# Patient Record
Sex: MALE | Race: WHITE | NOT HISPANIC OR LATINO | Employment: OTHER | URBAN - METROPOLITAN AREA
[De-identification: names, ages, dates, MRNs, and addresses within clinical notes are randomized per-mention and may not be internally consistent; named-entity substitution may affect disease eponyms.]

---

## 2017-06-15 ENCOUNTER — HISTORICAL (OUTPATIENT)
Dept: RADIOLOGY | Facility: HOSPITAL | Age: 57
End: 2017-06-15

## 2017-06-15 LAB
BUN SERPL-MCNC: 5 MG/DL (ref 7–18)
POC CREATININE: 0.9 MG/DL (ref 0.6–1.3)

## 2017-06-21 ENCOUNTER — HISTORICAL (OUTPATIENT)
Dept: CARDIOLOGY | Facility: HOSPITAL | Age: 57
End: 2017-06-21

## 2017-06-21 LAB
ABS NEUT (OLG): 1.72 X10(3)/MCL (ref 2.1–9.2)
BUN SERPL-MCNC: 5 MG/DL (ref 7–18)
CALCIUM SERPL-MCNC: 9.4 MG/DL (ref 8.5–10.1)
CHLORIDE SERPL-SCNC: 93 MMOL/L (ref 98–107)
CO2 SERPL-SCNC: 28 MMOL/L (ref 21–32)
CREAT SERPL-MCNC: 1.05 MG/DL (ref 0.7–1.3)
EOSINOPHIL NFR BLD MANUAL: 1 % (ref 0–8)
ERYTHROCYTE [DISTWIDTH] IN BLOOD BY AUTOMATED COUNT: 17.7 % (ref 11.5–17)
GLUCOSE SERPL-MCNC: 110 MG/DL (ref 74–106)
HCT VFR BLD AUTO: 39.3 % (ref 42–52)
HGB BLD-MCNC: 13.6 GM/DL (ref 14–18)
LYMPHOCYTES NFR BLD MANUAL: 34 % (ref 13–40)
LYMPHOCYTES NFR BLD MANUAL: 9 %
MCH RBC QN AUTO: 32.6 PG (ref 27–31)
MCHC RBC AUTO-ENTMCNC: 34.6 GM/DL (ref 33–36)
MCV RBC AUTO: 94.2 FL (ref 80–94)
MONOCYTES NFR BLD MANUAL: 8 % (ref 2–11)
NEUTROPHILS NFR BLD MANUAL: 48 % (ref 47–80)
PLATELET # BLD AUTO: 143 X10(3)/MCL (ref 130–400)
PLATELET # BLD EST: NORMAL 10*3/UL
PMV BLD AUTO: 9.1 FL (ref 7.4–10.4)
POTASSIUM SERPL-SCNC: 4.6 MMOL/L (ref 3.5–5.1)
RBC # BLD AUTO: 4.17 X10(6)/MCL (ref 4.7–6.1)
SODIUM SERPL-SCNC: 134 MMOL/L (ref 136–145)
WBC # SPEC AUTO: 3.4 X10(3)/MCL (ref 4.5–11.5)

## 2017-07-28 ENCOUNTER — HISTORICAL (OUTPATIENT)
Dept: CARDIOLOGY | Facility: HOSPITAL | Age: 57
End: 2017-07-28

## 2017-07-28 LAB
ABS NEUT (OLG): 1.65 X10(3)/MCL (ref 2.1–9.2)
BUN SERPL-MCNC: 8 MG/DL (ref 7–18)
CALCIUM SERPL-MCNC: 9.4 MG/DL (ref 8.5–10.1)
CHLORIDE SERPL-SCNC: 90 MMOL/L (ref 98–107)
CO2 SERPL-SCNC: 30 MMOL/L (ref 21–32)
CREAT SERPL-MCNC: 0.96 MG/DL (ref 0.7–1.3)
EOSINOPHIL NFR BLD MANUAL: 2 % (ref 0–8)
ERYTHROCYTE [DISTWIDTH] IN BLOOD BY AUTOMATED COUNT: 16.4 % (ref 11.5–17)
GLUCOSE SERPL-MCNC: 107 MG/DL (ref 74–106)
HCT VFR BLD AUTO: 40.6 % (ref 42–52)
HGB BLD-MCNC: 14 GM/DL (ref 14–18)
LYMPHOCYTES NFR BLD MANUAL: 24 % (ref 13–40)
LYMPHOCYTES NFR BLD MANUAL: 7 %
MCH RBC QN AUTO: 32.3 PG (ref 27–31)
MCHC RBC AUTO-ENTMCNC: 34.5 GM/DL (ref 33–36)
MCV RBC AUTO: 93.8 FL (ref 80–94)
MONOCYTES NFR BLD MANUAL: 14 % (ref 2–11)
NEUTROPHILS NFR BLD MANUAL: 52 % (ref 47–80)
PLATELET # BLD AUTO: 113 X10(3)/MCL (ref 130–400)
PLATELET # BLD EST: ABNORMAL 10*3/UL
PMV BLD AUTO: 9.2 FL (ref 7.4–10.4)
POTASSIUM SERPL-SCNC: 4.5 MMOL/L (ref 3.5–5.1)
RBC # BLD AUTO: 4.33 X10(6)/MCL (ref 4.7–6.1)
SODIUM SERPL-SCNC: 127 MMOL/L (ref 136–145)
WBC # SPEC AUTO: 2.9 X10(3)/MCL (ref 4.5–11.5)

## 2019-03-14 LAB
ABS NEUT (OLG): 2.49 X10(3)/MCL (ref 2.1–9.2)
BASOPHILS # BLD AUTO: 0 X10(3)/MCL (ref 0–0.2)
BASOPHILS NFR BLD AUTO: 1 %
BUN SERPL-MCNC: 8 MG/DL (ref 7–18)
CALCIUM SERPL-MCNC: 9.3 MG/DL (ref 8.5–10.1)
CHLORIDE SERPL-SCNC: 102 MMOL/L (ref 98–107)
CO2 SERPL-SCNC: 29 MMOL/L (ref 21–32)
CREAT SERPL-MCNC: 0.96 MG/DL (ref 0.7–1.3)
CREAT/UREA NIT SERPL: 8.3
EOSINOPHIL # BLD AUTO: 0.1 X10(3)/MCL (ref 0–0.9)
EOSINOPHIL NFR BLD AUTO: 2 %
ERYTHROCYTE [DISTWIDTH] IN BLOOD BY AUTOMATED COUNT: 15.1 % (ref 11.5–17)
GLUCOSE SERPL-MCNC: 100 MG/DL (ref 74–106)
HCT VFR BLD AUTO: 41.4 % (ref 42–52)
HGB BLD-MCNC: 13.2 GM/DL (ref 14–18)
LYMPHOCYTES # BLD AUTO: 1.8 X10(3)/MCL (ref 0.6–4.6)
LYMPHOCYTES NFR BLD AUTO: 37 %
MCH RBC QN AUTO: 32.8 PG (ref 27–31)
MCHC RBC AUTO-ENTMCNC: 31.9 GM/DL (ref 33–36)
MCV RBC AUTO: 103 FL (ref 80–94)
MONOCYTES # BLD AUTO: 0.5 X10(3)/MCL (ref 0.1–1.3)
MONOCYTES NFR BLD AUTO: 10 %
NEUTROPHILS # BLD AUTO: 2.49 X10(3)/MCL (ref 2.1–9.2)
NEUTROPHILS NFR BLD AUTO: 50 %
PLATELET # BLD AUTO: 156 X10(3)/MCL (ref 130–400)
PMV BLD AUTO: 9.5 FL (ref 9.4–12.4)
POTASSIUM SERPL-SCNC: 4.2 MMOL/L (ref 3.5–5.1)
RBC # BLD AUTO: 4.02 X10(6)/MCL (ref 4.7–6.1)
SODIUM SERPL-SCNC: 137 MMOL/L (ref 136–145)
WBC # SPEC AUTO: 5 X10(3)/MCL (ref 4.5–11.5)

## 2019-03-18 ENCOUNTER — HISTORICAL (OUTPATIENT)
Dept: CARDIOLOGY | Facility: HOSPITAL | Age: 59
End: 2019-03-18

## 2021-01-15 ENCOUNTER — HISTORICAL (OUTPATIENT)
Dept: CARDIOLOGY | Facility: HOSPITAL | Age: 61
End: 2021-01-15

## 2022-04-30 NOTE — OP NOTE
DATE OF SURGERY:    06/21/2017    SURGEON:  Andre Martínez MD    PREOPERATIVE DIAGNOSIS:  Peripheral arterial disease with bilateral lower extremity claudication.    POSTOPERATIVE DIAGNOSIS:  Peripheral arterial disease with bilateral lower extremity claudication.    PROCEDURE PERFORMED:    1. Ultrasound-guided access of right common femoral artery.  2. Aortogram with bilateral lower extremity runoff.  3. Approximately 45 minutes of monitored conscious sedation.    ANESTHESIA:  Local with 1% lidocaine.    ESTIMATED BLOOD LOSS:  Less than 10 mL.    COMPLICATIONS:  None.    INDICATIONS:  The patient is a 56-year-old male with bilateral lower extremity short distance lifestyle-limiting claudication.  Patient with dampened biphasic signals on the right and monophasic signals in the left lower extremity.  Operation indicated for further investigation.    PROCEDURE IN DETAIL:  After informed consent was obtained, the patient was taken to the operating room and placed in supine position, prepped and draped in sterile fashion.  We used ultrasound to identify the common femoral artery on the patient's right side, and these images were saved.  We accessed with an 18-gauge needle followed by a wire via Seldinger technique, and ultimately placed a #4 Lao sheath and an Omniflush catheter in the aorta to the level of the renal arteries and performed aortogram.  Patient with widely patent bilateral renal arteries.  The aorta was widely patent as well as common iliac and external iliac arteries, although there were calcifications present.  We advanced the catheter to the distal external iliac artery on the patient's left hand side and performed left lower extremity runoff.  The patient's common femoral artery and profunda femoral artery were widely patent.  There was evidence of a prior femoral endarterectomy.  His superficial femoral artery was occluded throughout its length, and there was reconstitution above his  popliteal artery at the level of the patella.  He had 3-vessel runoff to the ankle.  There were no percutaneous options for revascularization.  We performed right lower extremity runoff through the existing sheath.  The patient's common femoral artery was widely patent as was the profunda femoral artery, and similarly to the other side, there was evidence of a femoral endarterectomy.  His superficial femoral artery was patent throughout its length, although there were several areas of severe stenosis.  His popliteal artery was widely patent, and he had 3-vessel runoff to the ankle, although there was a nearly occlusive calcification at the ostium of the anterior tibial artery.  Of note, he did have a high takeoff of his posterior tibial artery.  Wires and sheaths were removed.  Pressure was held.  The patient tolerated the procedure well without any acute events or complications.  The patient was transferred in stable condition to the recovery room.        ______________________________  MD TANIA Garcias III/JANNETH  DD:  08/27/2017  Time:  04:29AM  DT:  08/28/2017  Time:  08:37AM  Job #:  [id number]

## 2022-04-30 NOTE — OP NOTE
DATE OF SURGERY:    07/28/2017    SURGEON:  Andre Martínez MD    PREOPERATIVE DIAGNOSIS:  Peripheral arterial disease with bilateral lower extremity claudication.    POSTOPERATIVE DIAGNOSIS:  Peripheral arterial disease with bilateral lower extremity claudication.    PROCEDURE:  Ultrasound guided access left common femoral artery.   Aortogram with right lower extremity runoff.   Laser atherectomy of right superficial femoral artery.   Balloon angioplasty of right superficial femoral artery (6 mm chocolate).    ANESTHESIA:  Local with 1% lidocaine.    ESTIMATED BLOOD LOSS:  Less than 10 cc.    COMPLICATIONS:  None.    INDICATIONS FOR PROCEDURE:  This is a 56 year old male with a known history of bilateral lower extremity claudication.  He has left superficial femoral artery occlusion and right superficial femoral artery stenosis.  Operation indicated for revascularization of right lower extremity prior to left lower extremity bypass.    PROCEDURE IN DETAIL:  After informed consent was obtained, the patient was taken to the OR, and placed in a supine position, prepped and draped in the usual sterile fashion.  We used ultrasound to identify the common femoral artery and we accessed it with an 18 gauge needle followed by a wire via Seldinger technique and placed a 4-Australian sheath and Omniflush catheter in the aorta.  We performed aortogram which did not visualize the renal vessels well.  His right renal artery appeared to not have any significant stenoses.  His aorta had circumferential calcifications along with his common and internal and external iliac arteries however all were widely patent.  We advanced the catheter to the distal iliac artery on the patient's right hand side and performed right lower extremity runoff.  Patient's prior common femoral artery endarterectomy, which I believe was done by Dr. Horan, was widely patent.  His profunda femoral artery was widely patent as well.  The superficial femoral  artery had a stenosis proximally and was widely patent distal to this at the mid distal aspect.  There was severe stenoses throughout.  His popliteal artery had scattered disease but was patent and he had three vessel runoff to the ankle with a near occlusive calcification at the ostium of the anterior tibial artery.  We exchanged for a 6-Australian up and over sheath.  We crossed these superficial femoral artery lesions without difficulty and proved true lumen with contrast injection.  We performed laser atherectomy on multiple intensities throughout the right lower extremity and followed it with balloon angioplasty with a 6 mm cutting balloon.  Repeating imaging showed brisk flow of contrast through his superficial femoral artery with good resolution of these lesions.  Wires and sheaths were removed.  Pressure was held.  Patient tolerated the procedure well without any complications.  Patient was transferred to the Recovery Room.        ______________________________  MD TANIA Garcias III/LALITO  DD:  08/29/2017  Time:  03:51PM  DT:  08/30/2017  Time:  12:25PM  Job #:  [id number]

## 2022-04-30 NOTE — OP NOTE
DATE OF SURGERY:    01/15/2021    SURGEON:  Andre Martínez MD    PREOPERATIVE DIAGNOSIS:  Peripheral arterial disease with bilateral lower extremity claudication.    POSTOPERATIVE DIAGNOSIS:  Peripheral arterial disease with bilateral lower extremity claudication.    PROCEDURES PERFORMED:    1. Ultrasound-guided access of left common femoral artery.  2. Ultrasound-guided access of left brachial artery.  3. Aortogram with bilateral lower extremity runoff.  4. Catheter placement in third-order vessel.  5. Approximately 1 hour of moderate monitored conscious sedation.    ANESTHESIA:  Local with 1% lidocaine.    ESTIMATED BLOOD LOSS:  10 cc.    COMPLICATIONS:  None.    INDICATIONS FOR PROCEDURE:  The patient is a 60-year-old male with a known history of severe peripheral arterial disease.  He has a known occluded bypass of his left lower extremity and severe disease throughout with bilateral lower extremity claudication.  Operation indicated for further investigation.    PROCEDURE IN DETAIL:  After informed consent was obtained, the patient was taken to the operating room and placed in supine position.  He was prepped and draped in sterile fashion.  We used ultrasound to identify the common femoral artery on the patient's left hand side, and these images were saved.  We accessed this with a micropuncture needle followed by a wire via Seldinger technique.  However, there was difficulty with the wire traversing the iliac system.  We placed the micropuncture sheath into the common femoral artery and performed imaging which revealed an occluded left iliac system.  At this point, we performed runoff down the left lower extremity which revealed a patent common femoral artery and profunda femoral artery.  The patient's superficial femoral artery was occluded throughout its length as was the popliteal artery and known bypass occlusion.  The patient's anterior tibial and peroneal arteries were also occluded, and the  patient had single-vessel posterior tibial runoff to the level of the ankle.  At this point, we used ultrasound to identify the brachial artery in the patient's left upper extremity, and these images were saved.  We accessed this with an 18-gauge needle followed by a wire via Seldinger technique, and ultimately we placed a 4-Chinese sheath. We placed an Omni Flush catheter in the aorta and performed an aortogram which revealed a widely patent aorta and right iliac system.  As previously identified, his left iliac system was occluded.  We advanced the catheter to the femoral vessel of the right lower extremity and performed right lower extremity runoff.  The patient's common femoral artery and profunda femoral artery were patent.  The superficial femoral artery was occluded throughout its length, and he had reconstitution of the popliteal artery at the knee joint.  The patient has likely 2-vessel runoff to the level of the ankle via the peroneal and posterior tibial artery with a large calcification at the tibial trifurcation.  At this point, all wires and sheaths were removed, and pressure was held.  The patient tolerated the procedure well without any acute events or complications.  The patient had no options for percutaneous intervention and, if he were to have lifestyle-limiting symptoms, he would require an inflow procedure for his left lower extremity and a femoral to below-knee popliteal bypass of his right lower extremity with endarterectomy of the tibial trifurcation.        ______________________________  MD TANIA Garcias III/SK  DD:  02/23/2021  Time:  07:37AM  DT:  02/23/2021  Time:  08:09AM  Job #:  [id number]

## 2022-04-30 NOTE — OP NOTE
DATE OF SURGERY:    03/18/2019    SURGEON:  Andre Martínez MD    PREOPERATIVE DIAGNOSIS:  Peripheral arterial disease with left lower extremity claudication.    POSTOPERATIVE DIAGNOSIS:  Peripheral arterial disease with left lower extremity claudication.    PROCEDURE PERFORMED:    1. Ultrasound-guided access to right common femoral artery.  2. Aortogram with left lower extremity runoff.   3. Catheter placement in 3rd order of vessel.    4. Approximately 30 minutes of monitored moderate conscious sedation.    ANESTHESIA:  Local with 1% lidocaine.    ESTIMATED BLOOD LOSS:  Less than 5 cc.    COMPLICATIONS:  None.    INDICATIONS FOR PROCEDURE:  The patient is a 58-year-old male with known history of severe peripheral disease.  He has had a left lower extremity bypass in the past.  This is known to be occluded chronically, and he was seen in the office with worsening claudication.    PROCEDURE IN DETAIL:  After informed consent was obtained, the patient was taken to the operating room, placed in supine position.  He was prepped and draped in sterile fashion.  We used ultrasound to identify the common femoral artery on the patient's right hand side and these images were saved.  We accessed with an 18-gauge needle, followed by a wire via Seldinger technique.  We ultimately placed a 4-Korean sheath.  We placed an Omni Flush catheter in the aorta and performed aortogram which revealed a widely patent aorta with mild disease.  He also had a widely patent bilateral iliac system with no significant flow-limiting stenosis.  We advanced the catheter to the distal external iliac artery on the patient's left hand side and performed left lower extremity runoff.  The patient's common femoral artery and profunda femoral artery were widely patent with mild disease.  Patient's superficial femoral was occluded throughout its length, as was his popliteal artery.  He had collateralization through his lower extremity that fed 2  vessel runoff to the ankle via his anterior tibial and posterior tibial artery, which appeared relatively disease-free throughout its length.  There are no options for percutaneous intervention, and we will further discuss bypass, if appropriate, as an outpatient.  The wires and sheaths were removed and pressure was held.  The     patient tolerated the procedure well without any acute events or complications, was transferred in stable condition to the recovery room.        ______________________________  MD TANIA Garcias III/ALEX  DD:  03/18/2019  Time:  09:44AM  DT:  03/18/2019  Time:  10:14AM  Job #:  [id number]

## 2022-09-26 ENCOUNTER — OFFICE VISIT (OUTPATIENT)
Dept: NEUROLOGY | Facility: CLINIC | Age: 62
End: 2022-09-26
Payer: MEDICARE

## 2022-09-26 VITALS
HEIGHT: 66 IN | SYSTOLIC BLOOD PRESSURE: 124 MMHG | WEIGHT: 180 LBS | BODY MASS INDEX: 28.93 KG/M2 | DIASTOLIC BLOOD PRESSURE: 84 MMHG

## 2022-09-26 DIAGNOSIS — G40.009 LOCALIZATION-RELATED (FOCAL) (PARTIAL) IDIOPATHIC EPILEPSY AND EPILEPTIC SYNDROMES WITH SEIZURES OF LOCALIZED ONSET, NOT INTRACTABLE, WITHOUT STATUS EPILEPTICUS: Primary | ICD-10-CM

## 2022-09-26 DIAGNOSIS — I63.9 OCCIPITAL CEREBRAL INFARCTION: ICD-10-CM

## 2022-09-26 DIAGNOSIS — I67.82 CHRONIC CEREBRAL ISCHEMIA: ICD-10-CM

## 2022-09-26 DIAGNOSIS — H53.462 HEMIANOPIA, HOMONYMOUS, LEFT: ICD-10-CM

## 2022-09-26 PROCEDURE — 3074F PR MOST RECENT SYSTOLIC BLOOD PRESSURE < 130 MM HG: ICD-10-PCS | Mod: CPTII,S$GLB,, | Performed by: SPECIALIST

## 2022-09-26 PROCEDURE — 99999 PR PBB SHADOW E&M-EST. PATIENT-LVL IV: ICD-10-PCS | Mod: PBBFAC,,, | Performed by: SPECIALIST

## 2022-09-26 PROCEDURE — 1159F MED LIST DOCD IN RCRD: CPT | Mod: CPTII,S$GLB,, | Performed by: SPECIALIST

## 2022-09-26 PROCEDURE — 3008F PR BODY MASS INDEX (BMI) DOCUMENTED: ICD-10-PCS | Mod: CPTII,S$GLB,, | Performed by: SPECIALIST

## 2022-09-26 PROCEDURE — 3079F DIAST BP 80-89 MM HG: CPT | Mod: CPTII,S$GLB,, | Performed by: SPECIALIST

## 2022-09-26 PROCEDURE — 99205 PR OFFICE/OUTPT VISIT, NEW, LEVL V, 60-74 MIN: ICD-10-PCS | Mod: S$GLB,,, | Performed by: SPECIALIST

## 2022-09-26 PROCEDURE — 3008F BODY MASS INDEX DOCD: CPT | Mod: CPTII,S$GLB,, | Performed by: SPECIALIST

## 2022-09-26 PROCEDURE — 1159F PR MEDICATION LIST DOCUMENTED IN MEDICAL RECORD: ICD-10-PCS | Mod: CPTII,S$GLB,, | Performed by: SPECIALIST

## 2022-09-26 PROCEDURE — 3079F PR MOST RECENT DIASTOLIC BLOOD PRESSURE 80-89 MM HG: ICD-10-PCS | Mod: CPTII,S$GLB,, | Performed by: SPECIALIST

## 2022-09-26 PROCEDURE — 99205 OFFICE O/P NEW HI 60 MIN: CPT | Mod: S$GLB,,, | Performed by: SPECIALIST

## 2022-09-26 PROCEDURE — 99999 PR PBB SHADOW E&M-EST. PATIENT-LVL IV: CPT | Mod: PBBFAC,,, | Performed by: SPECIALIST

## 2022-09-26 PROCEDURE — 3074F SYST BP LT 130 MM HG: CPT | Mod: CPTII,S$GLB,, | Performed by: SPECIALIST

## 2022-09-26 RX ORDER — AZELASTINE 1 MG/ML
1 SPRAY, METERED NASAL
COMMUNITY

## 2022-09-26 RX ORDER — METOPROLOL TARTRATE 50 MG/1
50 TABLET ORAL 2 TIMES DAILY
COMMUNITY
Start: 2022-09-12

## 2022-09-26 RX ORDER — LEVETIRACETAM 500 MG/1
500 TABLET ORAL 2 TIMES DAILY
Qty: 60 TABLET | Refills: 11 | Status: SHIPPED | OUTPATIENT
Start: 2022-09-26 | End: 2023-10-31

## 2022-09-26 RX ORDER — FAMOTIDINE 40 MG/1
40 TABLET, FILM COATED ORAL NIGHTLY
COMMUNITY
Start: 2022-09-12

## 2022-09-26 RX ORDER — IPRATROPIUM BROMIDE 42 UG/1
2 SPRAY, METERED NASAL
COMMUNITY
Start: 2022-09-12

## 2022-09-26 RX ORDER — FLUTICASONE PROPIONATE 50 MCG
1 SPRAY, SUSPENSION (ML) NASAL
COMMUNITY

## 2022-09-26 RX ORDER — FERROUS SULFATE TAB 325 MG (65 MG ELEMENTAL FE) 325 (65 FE) MG
TAB ORAL NIGHTLY
COMMUNITY
Start: 2022-08-22

## 2022-09-26 RX ORDER — TAMSULOSIN HYDROCHLORIDE 0.4 MG/1
1 CAPSULE ORAL NIGHTLY
COMMUNITY
Start: 2022-08-09

## 2022-09-26 RX ORDER — APIXABAN 5 MG/1
5 TABLET, FILM COATED ORAL 2 TIMES DAILY
COMMUNITY
Start: 2022-09-12

## 2022-09-26 RX ORDER — TADALAFIL 5 MG/1
5 TABLET ORAL
COMMUNITY
Start: 2022-09-12

## 2022-09-26 RX ORDER — ROSUVASTATIN CALCIUM 20 MG/1
20 TABLET, COATED ORAL NIGHTLY
COMMUNITY
Start: 2022-09-12

## 2022-09-26 RX ORDER — TRAZODONE HYDROCHLORIDE 50 MG/1
50 TABLET ORAL NIGHTLY
COMMUNITY
Start: 2022-09-14

## 2022-09-26 RX ORDER — CLOPIDOGREL BISULFATE 75 MG/1
75 TABLET ORAL DAILY
COMMUNITY
Start: 2022-09-12

## 2022-09-26 RX ORDER — PANTOPRAZOLE SODIUM 40 MG/1
40 TABLET, DELAYED RELEASE ORAL EVERY MORNING
COMMUNITY
Start: 2022-09-12

## 2022-09-26 NOTE — PROGRESS NOTES
Subjective:       Patient ID: Ean Funes is a 61 y.o. male.    Chief Complaint: ***    HPI:            Pt ref by Dr Bashir Phipps for LLE cramping (Here for syncope episodes, dizziness//Pt family reports episodes of shaking limbs, staring blankly, unable to speak; lasting 15 seconds or so; unaware of episodes; no confusion after episodes;  few episodes total. Denies tongue biting, loss of bowel/bladder control. Seen @ [facility] 2022 for dizziness; CTA Head/Neck, CT chest done during visits, reports and CD to review today. )      notes may also be on facesheet for HPI, ROS, and other sections     Review of Systems              Social History     Socioeconomic History    Marital status:    Tobacco Use    Smoking status: Every Day     Types: Cigarettes    Smokeless tobacco: Never   Substance and Sexual Activity    Alcohol use: Yes    Drug use: Never     ----------------------------  Hypertension      Current Outpatient Medications:     azelastine (ASTELIN) 137 mcg (0.1 %) nasal spray, 1 spray by Nasal route., Disp: , Rfl:     clopidogreL (PLAVIX) 75 mg tablet, Take 75 mg by mouth once daily., Disp: , Rfl:     ELIQUIS 5 mg Tab, Take 5 mg by mouth 2 (two) times daily., Disp: , Rfl:     famotidine (PEPCID) 40 MG tablet, SMARTSI Tablet(s) By Mouth Every Evening, Disp: , Rfl:     FEROSUL 325 mg (65 mg iron) Tab tablet, Take by mouth every morning., Disp: , Rfl:     fluticasone propionate (FLONASE) 50 mcg/actuation nasal spray, 1 spray by Nasal route., Disp: , Rfl:     ipratropium (ATROVENT) 42 mcg (0.06 %) nasal spray, 2 sprays 2 (two) times daily., Disp: , Rfl:     metoprolol tartrate (LOPRESSOR) 50 MG tablet, Take 50 mg by mouth 2 (two) times daily., Disp: , Rfl:     pantoprazole (PROTONIX) 40 MG tablet, Take 40 mg by mouth every morning., Disp: , Rfl:     rosuvastatin (CRESTOR) 20 MG tablet, Take 20 mg by mouth every evening., Disp: , Rfl:     tadalafiL (CIALIS) 5 MG tablet, Take 5 mg  "by mouth once daily., Disp: , Rfl:     tamsulosin (FLOMAX) 0.4 mg Cap, Take 1 capsule by mouth every evening., Disp: , Rfl:     traZODone (DESYREL) 50 MG tablet, Take 50 mg by mouth every evening., Disp: , Rfl:     levETIRAcetam (KEPPRA) 500 MG Tab, Take 1 tablet (500 mg total) by mouth 2 (two) times daily., Disp: 60 tablet, Rfl: 11     Objective:        Exam:   /84 (BP Location: Left arm, Patient Position: Sitting)   Ht 5' 6" (1.676 m)   Wt 81.6 kg (180 lb)   BMI 29.05 kg/m²     General Exam  __unaccompanied  if accompanied, by__  body habitus_ Body mass index is 29.05 kg/m².    mental status_alert and appropriate  oropharynx_Mallampati grade_  neck_  heart__  extremities_  skin_    Neurological:  cortical function__  MMSE:   No flowsheet data found.    speech__  cranial nerves:  CN 2 VF_ok   fundi_   CN 3, 4, 6 EOMs_ok  CN 3, pupils_ok    CN 7_no lower face asymmetry  CN 8_hearing _  CN 12 tongue_ok    motor__  tone:   muscle stretch reflexes__  Vib sens_  Pin sens_  plantars__  tremor: _  coordination: _  gait_   Romberg:     Neuroimaging:  Images and imaging reports reviewed.  Study / studies:   Rads summary:  , radiologist  My comments:       Labs:      __ new patient here and/or   ___ multiple issues/ diagnoses or problems [if not enumerated in note then discussed in encounter but chose to or failed to document]    complexity of data   __high _mod   __ images and reports reviewed:  __ hx obtained from family or accompaniment:   __other studies reviewed   __studies ordered __   __studies considered or discussed but not ordered __  __DDx discussed __    risks  __high _mod     __ (possible or definite) neurodegenerative condition expected to progress  __ (poss or def)             autoimmune condition with possibility of flares or unexpected attack  __ (poss or def)             seiz d.o. with possib of recurr seiz's   __ cerebrovasc ds with risk of recurrence of stroke  __ CNS meds (and/or) potentially " high risk non CNS meds which may cause medical or behavioral side effects  __ fall risk  __ driving discussed   __ diagnosis unclear or DDx wide making risk uncertain to high  __other:      MDM/Medical Decision Making used for CPT choice based on above elements:  __high _moderate         Assessment/Plan:       Problem List Items Addressed This Visit          Neuro    Localization-related (focal) (partial) idiopathic epilepsy and epileptic syndromes with seizures of localized onset, not intractable, without status epilepticus - Primary    Chronic cerebral ischemia    Occipital cerebral infarction       Ophtho    Hemianopia, homonymous, left             Other comments/ follow up:          No orders of the defined types were placed in this encounter.     Medications Ordered This Encounter   Medications    levETIRAcetam (KEPPRA) 500 MG Tab     Sig: Take 1 tablet (500 mg total) by mouth 2 (two) times daily.     Dispense:  60 tablet     Refill:  11        __med prescribed  __med modified   __med refilled (if taking over Rx from prior provider)    Aim follow up ___ months ___Dr. MILES         __JT, NP       __KR, NP   __Followup PRN    Bashir Worrell MD [facility]

## 2022-09-26 NOTE — PROGRESS NOTES
"Subjective:       Patient ID: Ean Funes is a 61 y.o. male.    Chief Complaint: new visit for syncope / seizure / old stroke     HPI:            Pt ref by Dr Bashir Phipps for LLE cramping (Here for syncope episodes, dizziness//Pt family reports episodes of shaking limbs, staring blankly, unable to speak; lasting 15 seconds or so; unaware of episodes; no confusion after episodes;  few episodes total. Denies tongue biting, loss of bowel/bladder control. Seen @ [facility] 2022 for dizziness; CTA Head/Neck, CT chest done during visits, reports and CD to review today. )  Partner describes at least one episode where he was in recliner watching movie with her when his arms and legs stiffened up and he was unable to speak with wide eyes stare and this WAS NOT preceded by coughing or choking     notes may also be on facesheet for HPI, ROS, and other sections     Review of Systems  "TIA" years ago  with some persistent L vision impairment [showed him and her the chronic R occip infarct that caused this]   He has quit smoking in the past but is smoking now   He's tried cpap in the past but could not tolerate it   He drinks beers, sometimes greater than 6/d [yesterday nine per partner]          Social History     Socioeconomic History    Marital status:    Tobacco Use    Smoking status: Every Day     Types: Cigarettes    Smokeless tobacco: Never   Substance and Sexual Activity    Alcohol use: Yes    Drug use: Never     ----------------------------  Hypertension      Current Outpatient Medications:     azelastine (ASTELIN) 137 mcg (0.1 %) nasal spray, 1 spray by Nasal route., Disp: , Rfl:     clopidogreL (PLAVIX) 75 mg tablet, Take 75 mg by mouth once daily., Disp: , Rfl:     ELIQUIS 5 mg Tab, Take 5 mg by mouth 2 (two) times daily., Disp: , Rfl:     famotidine (PEPCID) 40 MG tablet, SMARTSI Tablet(s) By Mouth Every Evening, Disp: , Rfl:     FEROSUL 325 mg (65 mg iron) Tab tablet, Take by " "mouth every morning., Disp: , Rfl:     fluticasone propionate (FLONASE) 50 mcg/actuation nasal spray, 1 spray by Nasal route., Disp: , Rfl:     ipratropium (ATROVENT) 42 mcg (0.06 %) nasal spray, 2 sprays 2 (two) times daily., Disp: , Rfl:     metoprolol tartrate (LOPRESSOR) 50 MG tablet, Take 50 mg by mouth 2 (two) times daily., Disp: , Rfl:     pantoprazole (PROTONIX) 40 MG tablet, Take 40 mg by mouth every morning., Disp: , Rfl:     rosuvastatin (CRESTOR) 20 MG tablet, Take 20 mg by mouth every evening., Disp: , Rfl:     tadalafiL (CIALIS) 5 MG tablet, Take 5 mg by mouth once daily., Disp: , Rfl:     tamsulosin (FLOMAX) 0.4 mg Cap, Take 1 capsule by mouth every evening., Disp: , Rfl:     traZODone (DESYREL) 50 MG tablet, Take 50 mg by mouth every evening., Disp: , Rfl:     levETIRAcetam (KEPPRA) 500 MG Tab, Take 1 tablet (500 mg total) by mouth 2 (two) times daily., Disp: 60 tablet, Rfl: 11     Objective:        Exam:   /84 (BP Location: Left arm, Patient Position: Sitting)   Ht 5' 6" (1.676 m)   Wt 81.6 kg (180 lb)   BMI 29.05 kg/m²     General Exam  if accompanied, by__ girlfriend/ signifficant other   body habitus_ Body mass index is 29.05 kg/m².    mental status_alert and appropriate  oropharynx_Mallampati grade_  neck_  Heart__ irreg   Extremities_ R LE taut   [facility]   skin_    Neurological:  cortical function__  MMSE:   No flowsheet data found.    Speech __ ok   cranial nerves:  CN 2 VF_L hemianopia   fundi_   CN 3, 4, 6 EOMs_ok  CN 3, pupils_ok    CN 7_no lower face asymmetry  CN 8_hearing _  CN 12 tongue_ok    Motor__ ok   tone:   muscle stretch reflexes__  Vib sens_ ok   Pin sens_  plantars__  tremor: _  coordination: _  gait_ unaided   Romberg:     Neuroimaging:  Images and imaging reports reviewed.  Recent CT CTAs   My comments:   Chronic R occipital infarct and patchy widespread supratentorial chr isch change   Occl R ICA     Labs:      _._ new patient " here and/or   _.__ multiple issues/ diagnoses or problems [if not enumerated in note then discussed in encounter but chose to or failed to document]    complexity of data   _._high _mod   _._ images and reports reviewed:  _._ hx obtained from family or accompaniment:   __other studies reviewed   __studies ordered __   __studies considered or discussed but not ordered __  __DDx discussed __    risks  _._high _mod     __ (possible or definite) neurodegenerative condition expected to progress  __ (poss or def)             autoimmune condition with possibility of flares or unexpected attack  __ (poss or def)             seiz d.o. with possib of recurr seiz's   __ cerebrovasc ds with risk of recurrence of stroke  __ CNS meds (and/or) potentially high risk non CNS meds which may cause medical or behavioral side effects  __ fall risk  _._ driving discussed   advised HE SHOULD NOT BE DRIVING due to vision loss and seizures   __ diagnosis unclear or DDx wide making risk uncertain to high  __other:      MDM/Medical Decision Making used for CPT choice based on above elements:  _._high _moderate         Assessment/Plan:       Problem List Items Addressed This Visit          Neuro    Localization-related (focal) (partial) idiopathic epilepsy and epileptic syndromes with seizures of localized onset, not intractable, without status epilepticus - Primary    Chronic cerebral ischemia    Occipital cerebral infarction       Ophtho    Hemianopia, homonymous, left   NO DRIVING   Limit alcoholic beverages to two servings per day or less   Counseled about smoking cessation       Other comments/ follow up:           Medications Ordered This Encounter   Medications    levETIRAcetam (KEPPRA) 500 MG Tab     Sig: Take 1 tablet (500 mg total) by mouth 2 (two) times daily.     Dispense:  60 tablet     Refill:  11          Aim follow up _6__ months ___Dr. GINGER Worrell MD [facility]

## 2022-10-20 DIAGNOSIS — K13.70 MOUTH LESION: Primary | ICD-10-CM

## 2023-02-15 ENCOUNTER — HOSPITAL ENCOUNTER (OUTPATIENT)
Dept: RADIOLOGY | Facility: CLINIC | Age: 63
Discharge: HOME | End: 2023-02-15
Attending: SPECIALIST
Payer: MEDICARE

## 2023-02-15 ENCOUNTER — OFFICE VISIT (OUTPATIENT)
Dept: ORTHOPEDICS | Facility: CLINIC | Age: 63
End: 2023-02-15
Payer: MEDICARE

## 2023-02-15 VITALS
HEART RATE: 62 BPM | HEIGHT: 66 IN | DIASTOLIC BLOOD PRESSURE: 44 MMHG | WEIGHT: 182 LBS | SYSTOLIC BLOOD PRESSURE: 85 MMHG | BODY MASS INDEX: 29.25 KG/M2

## 2023-02-15 DIAGNOSIS — M25.561 RIGHT KNEE PAIN, UNSPECIFIED CHRONICITY: ICD-10-CM

## 2023-02-15 DIAGNOSIS — M25.562 CHRONIC PAIN OF BOTH KNEES: ICD-10-CM

## 2023-02-15 DIAGNOSIS — M17.11 PRIMARY OSTEOARTHRITIS OF RIGHT KNEE: Primary | ICD-10-CM

## 2023-02-15 DIAGNOSIS — M25.552 BILATERAL HIP PAIN: ICD-10-CM

## 2023-02-15 DIAGNOSIS — M25.561 CHRONIC PAIN OF BOTH KNEES: ICD-10-CM

## 2023-02-15 DIAGNOSIS — M25.551 BILATERAL HIP PAIN: ICD-10-CM

## 2023-02-15 DIAGNOSIS — I73.9 PERIPHERAL ARTERIAL DISEASE: ICD-10-CM

## 2023-02-15 DIAGNOSIS — M17.0 PRIMARY OSTEOARTHRITIS OF BOTH KNEES: Primary | ICD-10-CM

## 2023-02-15 DIAGNOSIS — G89.29 CHRONIC PAIN OF BOTH KNEES: ICD-10-CM

## 2023-02-15 PROCEDURE — 99203 OFFICE O/P NEW LOW 30 MIN: CPT | Mod: ,,, | Performed by: SPECIALIST

## 2023-02-15 PROCEDURE — 73564 XR KNEE COMP 4 OR MORE VIEWS BILAT: ICD-10-PCS | Mod: 50,,, | Performed by: SPECIALIST

## 2023-02-15 PROCEDURE — 73521 X-RAY EXAM HIPS BI 2 VIEWS: CPT | Mod: ,,, | Performed by: SPECIALIST

## 2023-02-15 PROCEDURE — 1160F PR REVIEW ALL MEDS BY PRESCRIBER/CLIN PHARMACIST DOCUMENTED: ICD-10-PCS | Mod: CPTII,,, | Performed by: SPECIALIST

## 2023-02-15 PROCEDURE — 3008F BODY MASS INDEX DOCD: CPT | Mod: CPTII,,, | Performed by: SPECIALIST

## 2023-02-15 PROCEDURE — 73521 XR HIPS BILATERAL 2 VIEW INCL AP PELVIS: ICD-10-PCS | Mod: ,,, | Performed by: SPECIALIST

## 2023-02-15 PROCEDURE — 3078F DIAST BP <80 MM HG: CPT | Mod: CPTII,,, | Performed by: SPECIALIST

## 2023-02-15 PROCEDURE — 99203 PR OFFICE/OUTPT VISIT, NEW, LEVL III, 30-44 MIN: ICD-10-PCS | Mod: ,,, | Performed by: SPECIALIST

## 2023-02-15 PROCEDURE — 3078F PR MOST RECENT DIASTOLIC BLOOD PRESSURE < 80 MM HG: ICD-10-PCS | Mod: CPTII,,, | Performed by: SPECIALIST

## 2023-02-15 PROCEDURE — 1159F MED LIST DOCD IN RCRD: CPT | Mod: CPTII,,, | Performed by: SPECIALIST

## 2023-02-15 PROCEDURE — 1159F PR MEDICATION LIST DOCUMENTED IN MEDICAL RECORD: ICD-10-PCS | Mod: CPTII,,, | Performed by: SPECIALIST

## 2023-02-15 PROCEDURE — 3074F SYST BP LT 130 MM HG: CPT | Mod: CPTII,,, | Performed by: SPECIALIST

## 2023-02-15 PROCEDURE — 73564 X-RAY EXAM KNEE 4 OR MORE: CPT | Mod: 50,,, | Performed by: SPECIALIST

## 2023-02-15 PROCEDURE — 1160F RVW MEDS BY RX/DR IN RCRD: CPT | Mod: CPTII,,, | Performed by: SPECIALIST

## 2023-02-15 PROCEDURE — 3008F PR BODY MASS INDEX (BMI) DOCUMENTED: ICD-10-PCS | Mod: CPTII,,, | Performed by: SPECIALIST

## 2023-02-15 PROCEDURE — 3074F PR MOST RECENT SYSTOLIC BLOOD PRESSURE < 130 MM HG: ICD-10-PCS | Mod: CPTII,,, | Performed by: SPECIALIST

## 2023-02-15 NOTE — PROGRESS NOTES
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    doxycycline (VIBRAMYCIN) 50 mg/5 mL Syrp Take 50 mg by mouth.    ELIQUIS 5 mg Tab Take 5 mg by mouth 2 (two) times daily.    famotidine (PEPCID) 40 MG tablet SMARTSI Tablet(s) By Mouth Every Evening    levETIRAcetam (KEPPRA) 500 MG Tab Take 1 tablet (500 mg total) by mouth 2 (two) times daily.    metoprolol tartrate (LOPRESSOR) 50 MG tablet Take 50 mg by mouth 2 (two) times daily.    pantoprazole (PROTONIX) 40 MG tablet Take 40 mg by mouth every morning.    rosuvastatin (CRESTOR) 20 MG tablet Take 20 mg by mouth every evening.    azelastine (ASTELIN) 137 mcg (0.1 %) nasal spray 1 spray by Nasal route.    FEROSUL 325 mg (65 mg iron) Tab tablet Take by mouth every morning.    fluticasone propionate (FLONASE) 50 mcg/actuation nasal spray 1 spray by Nasal route.    ipratropium (ATROVENT) 42 mcg (0.06 %) nasal spray 2 sprays 2 (two) times daily.    tadalafiL (CIALIS) 5 MG tablet Take 5 mg by mouth once daily.    tamsulosin (FLOMAX) 0.4 mg Cap Take 1 capsule by mouth every evening.    traZODone (DESYREL) 50 MG tablet Take 50 mg by mouth every evening.     No current facility-administered medications for this visit.       Review of patient's allergies indicates:   Allergen Reactions    Strawberry Anaphylaxis, Itching and Other (See Comments)     Other reaction(s): Hives, Throat closes       Family History   Family history unknown: Yes       Social History     Socioeconomic History    Marital status:    Tobacco Use    Smoking status: Every Day     Types: Cigarettes    Smokeless tobacco: Never   Substance and Sexual Activity    Alcohol use: Yes    Drug use: Never       Chief Complaint:   Chief Complaint   Patient  "presents with    Left Knee - Pain     Right knee pain. Been hurting all his life. Has had scopes done in the past. Right hip has been hurting him recently. Constant pain that hurts him on the outside and behind the knee.        Consulting Physician: No ref. provider found    History of present illness:    This is a 62 y.o. year old male who complains of bilateral knee pain and bilateral buttock pain.  Patient has advanced osteoarthritis of both knees.  He also has peripheral arterial disease.  He has had multiple stents and iliac grafts as well as femoral popliteal bypass grafts.  This has been performed in both lower extremities.  He has good perfusion.  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  He ambulates with a cane and walker.    Review of Systems:    Constitution:   Denies chills, fever, and sweats.  HENT:   Denies headaches or blurry vision.  Cardiovascular:  Denies chest pain or irregular heart beat.  Respiratory:   Denies cough or shortness of breath.  Gastrointestinal:  Denies abdominal pain, nausea, or vomiting.  Musculoskeletal:   Denies muscle cramps.  Neurological:   Denies dizziness or focal weakness.  Psychiatric/Behavior: Normal mental status.  Hematology/Lymph:  Denies bleeding problem or easy bruising/bleeding.  Skin:    Denies rash or suspicious lesions.    Examination:    Vital Signs:    Vitals:    02/15/23 1340   BP: (!) 85/44   Pulse: 62   Weight: 82.6 kg (182 lb)   Height: 5' 6" (1.676 m)   PainSc: 10-Worst pain ever       Body mass index is 29.38 kg/m².    Constitution:   Well-developed, well nourished patient in no acute distress.  Neurological:   Alert and oriented x 3 and cooperative to examination.     Psychiatric/Behavior: Normal mental status.  Respiratory:   No shortness of breath.  Eyes:    Extraoccular muscles intact  Skin:    No scars, rash or suspicious lesions.    Physical Exam:     General Musculoskeletal Exam   Gait: " antalgic       Right Knee Exam     Inspection   Erythema: absent  Effusion: present  Deformity: present  Bruising: absent    Tenderness   The patient is tender to palpation of the condyle, iliotibial band, lateral joint line and lateral retinaculum.    Crepitus   The patient has crepitus of the lateral joint line.    Range of Motion   Extension: abnormal   Flexion: abnormal   10 degrees to 120 degrees    Tests   Meniscus   Porter:  Medial - negative Lateral - positive  Ligament Examination   MCL - Valgus: normal (0 to 2mm)  LCL - Varus: normal  Patella   Passive Patellar Tilt: neutral    Other   Sensation: normal    Comments:  Valgus deformity    Muscle Strength   Right Lower Extremity   Quadriceps:  5/5   Hamstrin/5     Vascular Exam     Right Pulses  Dorsalis Pedis:      2+  Posterior Tibial:      2+          General Musculoskeletal Exam   Gait: antalgic         Left Knee Exam     Inspection   Erythema: absent  Effusion: present  Deformity: present  Bruising: absent    Tenderness   The patient tender to palpation of the lateral retinaculum, lateral joint line, condyle and iliotibial band.    Crepitus   The patient has crepitus of the lateral joint line.    Range of Motion   Extension: abnormal   Flexion: abnormal   10 degrees to 120 degrees    Tests   Meniscus   Porter:  Medial - negative Lateral - negative  Stability   MCL - Valgus: normal (0 to 2mm)  LCL - Varus: normal (0 to 2mm)  Patella   Passive Patellar Tilt: neutral    Other   Sensation: normal    Comments:  Valgus deformity    Muscle Strength   Left Lower Extremity   Quadriceps:  5/5   Hamstrin/5     Vascular Exam       Left Pulses  Dorsalis Pedis:      2+  Posterior Tibial:      2+    Bilateral hip exam:  Full active and passive range of motion with no tenderness   No weight-bearing tenderness   Mild tenderness in right and left buttocks   No swelling, no redness, no increased heat  Intact motor and sensory function      Imaging: X-rays  ordered and images interpreted today personally by me of four views of the right and left knees which show that the patient is bone-on-bone in the lateral compartments of both knees with grade 4 changes and sclerosis in the lateral compartments.  He has tricompartmental osteophytes.  Impression: Advanced osteoarthrosis right and left knees.  Peripheral arterial disease with calcifications noted as well as bypass grafts and stents noted.       X-rays ordered and images interpreted today personally by me of AP pelvis and AP and lateral of right and left hips which show normal cartilage spaces and no evidence of osteoarthrosis of either hip.  Impression: As above.  Assessment: Primary osteoarthritis of both knees    Right knee pain, unspecified chronicity    Chronic pain of both knees  -     Cancel: X-Ray Knee 3 View Bilateral; Future; Expected date: 02/15/2023  -     X-Ray Knee Complete 4 Or More Views Bilat; Future; Expected date: 02/15/2023    Bilateral hip pain  -     X-Ray Hips Bilateral 2 View Incl AP Pelvis; Future; Expected date: 02/15/2023    Peripheral arterial disease        Plan:  Discussed surgical and nonsurgical options.  Patient has had multiple injections in the past and does not want to go this route again.  He has had physical therapy as well.  Has persistent pain and wants to proceed with operation.    Plan is robotic assisted right total knee arthroplasty without a tourniquet secondary to his peripheral arterial disease and bypass grafts    Risks, benefits, alternatives, and complications were explained to the patient and/or patient representative. They understand, agree, and want to proceed with the operation/ procedure.        DISCLAIMER: This note may have been dictated using voice recognition software and may contain grammatical errors.     NOTE: Consult report sent to referring provider via [employer].

## 2023-03-16 DIAGNOSIS — R26.89 IMPAIRED GAIT AND MOBILITY: ICD-10-CM

## 2023-03-16 DIAGNOSIS — M17.11 PRIMARY OSTEOARTHRITIS OF RIGHT KNEE: Primary | ICD-10-CM

## 2023-03-16 DIAGNOSIS — I73.9 PERIPHERAL ARTERIAL DISEASE: ICD-10-CM

## 2023-03-16 DIAGNOSIS — Z01.812 PRE-OPERATIVE LABORATORY EXAMINATION: ICD-10-CM

## 2023-03-16 DIAGNOSIS — I10 HYPERTENSION, UNSPECIFIED TYPE: ICD-10-CM

## 2023-03-16 DIAGNOSIS — Z95.828 HISTORY OF EXTREMITY BYPASS GRAFT: ICD-10-CM

## 2023-03-16 RX ORDER — ACETAMINOPHEN 500 MG
1000 TABLET ORAL
Status: CANCELLED | OUTPATIENT
Start: 2023-03-16

## 2023-03-16 RX ORDER — SCOLOPAMINE TRANSDERMAL SYSTEM 1 MG/1
1 PATCH, EXTENDED RELEASE TRANSDERMAL ONCE AS NEEDED
Status: CANCELLED | OUTPATIENT
Start: 2023-03-16 | End: 2034-08-11

## 2023-03-16 RX ORDER — SODIUM CHLORIDE, SODIUM GLUCONATE, SODIUM ACETATE, POTASSIUM CHLORIDE AND MAGNESIUM CHLORIDE 30; 37; 368; 526; 502 MG/100ML; MG/100ML; MG/100ML; MG/100ML; MG/100ML
250 INJECTION, SOLUTION INTRAVENOUS CONTINUOUS
Status: CANCELLED | OUTPATIENT
Start: 2023-03-16

## 2023-03-16 RX ORDER — KETOROLAC TROMETHAMINE 10 MG/1
10 TABLET, FILM COATED ORAL
Status: CANCELLED | OUTPATIENT
Start: 2023-03-16 | End: 2023-03-16

## 2023-03-16 RX ORDER — TRANEXAMIC ACID 650 MG/1
1950 TABLET ORAL
Status: CANCELLED | OUTPATIENT
Start: 2023-03-16 | End: 2023-03-16

## 2023-03-16 RX ORDER — GABAPENTIN 100 MG/1
300 CAPSULE ORAL
Status: CANCELLED | OUTPATIENT
Start: 2023-03-16

## 2023-03-17 ENCOUNTER — PATIENT MESSAGE (OUTPATIENT)
Dept: ORTHOPEDICS | Facility: CLINIC | Age: 63
End: 2023-03-17

## 2023-03-17 ENCOUNTER — TELEPHONE (OUTPATIENT)
Dept: ORTHOPEDICS | Facility: CLINIC | Age: 63
End: 2023-03-17

## 2023-03-17 NOTE — TELEPHONE ENCOUNTER
Pt's significant other stated that Pt is not feeling well and is unable to make pre-op appt on 3/17/23 at 9:30 am.     LVM for S/O that we R/S appt on Wednesday 3/22/23 at 8:15 a.m. per Kaden.    Sent [facility] message as well.

## 2023-03-21 ENCOUNTER — TELEPHONE (OUTPATIENT)
Dept: ORTHOPEDICS | Facility: CLINIC | Age: 63
End: 2023-03-21
Payer: MEDICARE

## 2023-03-21 NOTE — TELEPHONE ENCOUNTER
Pt's wife wanted to know if Pt needed to fast for his pre-op labs tomorrow, 3/22/23. I informed her that it is ideal if Pt can fast for labs.    She also inquired if we received Pt's ECHO from last week from Dr. Phipps in [place]. I do not show receipt of this so she gave me Dr. Phipps's office number.    I LVM for nurse to fax those results to our fax number.    Pt verbalized understanding and will call with any questions or concerns.

## 2023-03-22 ENCOUNTER — HOSPITAL ENCOUNTER (OUTPATIENT)
Dept: RADIOLOGY | Facility: HOSPITAL | Age: 63
Discharge: HOME | End: 2023-03-22
Attending: PHYSICIAN ASSISTANT
Payer: MEDICARE

## 2023-03-22 ENCOUNTER — OFFICE VISIT (OUTPATIENT)
Dept: ORTHOPEDICS | Facility: CLINIC | Age: 63
End: 2023-03-22
Payer: MEDICARE

## 2023-03-22 VITALS
BODY MASS INDEX: 30.05 KG/M2 | SYSTOLIC BLOOD PRESSURE: 111 MMHG | WEIGHT: 187 LBS | HEIGHT: 66 IN | DIASTOLIC BLOOD PRESSURE: 63 MMHG | TEMPERATURE: 97 F | HEART RATE: 58 BPM

## 2023-03-22 DIAGNOSIS — I10 HYPERTENSION, UNSPECIFIED TYPE: ICD-10-CM

## 2023-03-22 DIAGNOSIS — R26.89 IMPAIRED GAIT AND MOBILITY: ICD-10-CM

## 2023-03-22 DIAGNOSIS — I73.9 PERIPHERAL ARTERIAL DISEASE: ICD-10-CM

## 2023-03-22 DIAGNOSIS — R79.9 ABNORMAL BLOOD CHEMISTRY: ICD-10-CM

## 2023-03-22 DIAGNOSIS — Z95.828 HISTORY OF EXTREMITY BYPASS GRAFT: ICD-10-CM

## 2023-03-22 DIAGNOSIS — M17.11 PRIMARY OSTEOARTHRITIS OF RIGHT KNEE: Primary | ICD-10-CM

## 2023-03-22 DIAGNOSIS — Z01.812 PRE-OPERATIVE LABORATORY EXAMINATION: ICD-10-CM

## 2023-03-22 DIAGNOSIS — M17.11 PRIMARY OSTEOARTHRITIS OF RIGHT KNEE: ICD-10-CM

## 2023-03-22 LAB — MRSA PCR SCRN (OHS): NOT DETECTED

## 2023-03-22 PROCEDURE — 1159F MED LIST DOCD IN RCRD: CPT | Mod: CPTII,,, | Performed by: PHYSICIAN ASSISTANT

## 2023-03-22 PROCEDURE — 71046 X-RAY EXAM CHEST 2 VIEWS: CPT | Mod: TC

## 2023-03-22 PROCEDURE — 1159F PR MEDICATION LIST DOCUMENTED IN MEDICAL RECORD: ICD-10-PCS | Mod: CPTII,,, | Performed by: PHYSICIAN ASSISTANT

## 2023-03-22 PROCEDURE — 1160F PR REVIEW ALL MEDS BY PRESCRIBER/CLIN PHARMACIST DOCUMENTED: ICD-10-PCS | Mod: CPTII,,, | Performed by: PHYSICIAN ASSISTANT

## 2023-03-22 PROCEDURE — 3008F PR BODY MASS INDEX (BMI) DOCUMENTED: ICD-10-PCS | Mod: CPTII,,, | Performed by: PHYSICIAN ASSISTANT

## 2023-03-22 PROCEDURE — 99213 PR OFFICE/OUTPT VISIT, EST, LEVL III, 20-29 MIN: ICD-10-PCS | Mod: ,,, | Performed by: PHYSICIAN ASSISTANT

## 2023-03-22 PROCEDURE — 3074F SYST BP LT 130 MM HG: CPT | Mod: CPTII,,, | Performed by: PHYSICIAN ASSISTANT

## 2023-03-22 PROCEDURE — 3078F DIAST BP <80 MM HG: CPT | Mod: CPTII,,, | Performed by: PHYSICIAN ASSISTANT

## 2023-03-22 PROCEDURE — 99213 OFFICE O/P EST LOW 20 MIN: CPT | Mod: ,,, | Performed by: PHYSICIAN ASSISTANT

## 2023-03-22 PROCEDURE — 3008F BODY MASS INDEX DOCD: CPT | Mod: CPTII,,, | Performed by: PHYSICIAN ASSISTANT

## 2023-03-22 PROCEDURE — 3078F PR MOST RECENT DIASTOLIC BLOOD PRESSURE < 80 MM HG: ICD-10-PCS | Mod: CPTII,,, | Performed by: PHYSICIAN ASSISTANT

## 2023-03-22 PROCEDURE — 3074F PR MOST RECENT SYSTOLIC BLOOD PRESSURE < 130 MM HG: ICD-10-PCS | Mod: CPTII,,, | Performed by: PHYSICIAN ASSISTANT

## 2023-03-22 PROCEDURE — 1160F RVW MEDS BY RX/DR IN RCRD: CPT | Mod: CPTII,,, | Performed by: PHYSICIAN ASSISTANT

## 2023-03-22 NOTE — H&P
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    clopidogreL (PLAVIX) 75 mg tablet Take 75 mg by mouth once daily.    doxycycline (VIBRAMYCIN) 50 mg/5 mL Syrp Take 50 mg by mouth.    ELIQUIS 5 mg Tab Take 5 mg by mouth 2 (two) times daily.    famotidine (PEPCID) 40 MG tablet SMARTSI Tablet(s) By Mouth Every Evening    FEROSUL 325 mg (65 mg iron) Tab tablet Take by mouth every morning.    fluticasone propionate (FLONASE) 50 mcg/actuation nasal spray 1 spray by Nasal route.    ipratropium (ATROVENT) 42 mcg (0.06 %) nasal spray 2 sprays 2 (two) times daily.    levETIRAcetam (KEPPRA) 500 MG Tab Take 1 tablet (500 mg total) by mouth 2 (two) times daily.    metoprolol tartrate (LOPRESSOR) 50 MG tablet Take 50 mg by mouth 2 (two) times daily.    pantoprazole (PROTONIX) 40 MG tablet Take 40 mg by mouth every morning.    rosuvastatin (CRESTOR) 20 MG tablet Take 20 mg by mouth every evening.    tadalafiL (CIALIS) 5 MG tablet Take 5 mg by mouth once daily.    tamsulosin (FLOMAX) 0.4 mg Cap Take 1 capsule by mouth every evening.    traZODone (DESYREL) 50 MG tablet Take 50 mg by mouth every evening.     No current facility-administered medications for this visit.       Review of patient's allergies indicates:   Allergen Reactions    Strawberry Anaphylaxis, Itching and Other (See Comments)     Other reaction(s): Hives, Throat closes       Family History   Family history unknown: Yes       Social History     Socioeconomic History    Marital status:    Tobacco Use    Smoking status: Every Day     Types: Cigarettes    Smokeless tobacco: Never   Substance and Sexual Activity    Alcohol use: Yes    Drug use: Never       Chief Complaint:   Chief Complaint   Patient  "presents with    Right Knee - Pre-op Exam       Consulting Physician: No ref. provider found    History of present illness:    This is a 62 y.o. year old male who presents today for robotic assisted right total knee arthroplasty on April 11th.  No new complaints or concerns today.  Patient has advanced osteoarthritis of both knees.  He also has peripheral arterial disease.  He has had multiple stents and iliac grafts as well as femoral popliteal bypass grafts.  This has been performed in both lower extremities.  He has good perfusion.  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  He is currently on Eliquis and Plavix.  He ambulates with a cane and walker.    Review of Systems:    Constitution:   Denies chills, fever, and sweats.  HENT:   Denies headaches or blurry vision.  Cardiovascular:  Denies chest pain or irregular heart beat.  Respiratory:   Denies cough or shortness of breath.  Gastrointestinal:  Denies abdominal pain, nausea, or vomiting.  Musculoskeletal:   Denies muscle cramps.  Neurological:   Denies dizziness or focal weakness.  Psychiatric/Behavior: Normal mental status.  Hematology/Lymph:  Denies bleeding problem or easy bruising/bleeding.  Skin:    Denies rash or suspicious lesions.    Examination:    Vital Signs:    Vitals:    03/22/23 0836   BP: 111/63   Pulse: (!) 58   Temp: 97.2 °F (36.2 °C)   Weight: 84.8 kg (187 lb)   Height: 5' 5.65" (1.668 m)       Body mass index is 30.5 kg/m².    Constitution:   Well-developed, well nourished patient in no acute distress.  Neurological:   Alert and oriented x 3 and cooperative to examination.     Psychiatric/Behavior: Normal mental status.  Respiratory:   No shortness of breath.  Eyes:    Extraoccular muscles intact  Skin:    No scars, rash or suspicious lesions.    Physical Exam:     General Musculoskeletal Exam   Gait: antalgic       Right Knee Exam     Inspection   Erythema: absent  Effusion: " present  Deformity: present  Bruising: absent    Tenderness   The patient is tender to palpation of the condyle, iliotibial band, lateral joint line and lateral retinaculum.    Crepitus   The patient has crepitus of the lateral joint line.    Range of Motion   Extension: abnormal   Flexion: abnormal   10 degrees to 120 degrees    Tests   Meniscus   Porter:  Medial - negative Lateral - positive  Ligament Examination   MCL - Valgus: normal (0 to 2mm)  LCL - Varus: normal  Patella   Passive Patellar Tilt: neutral    Other   Sensation: normal    Comments:  Valgus deformity    Muscle Strength   Right Lower Extremity   Quadriceps:  5/5   Hamstrin/5     Vascular Exam     Right Pulses  Dorsalis Pedis:      2+  Posterior Tibial:      2+          General Musculoskeletal Exam   Gait: antalgic               Imaging: X-rays reviewed of the right and left knees which show that the patient is bone-on-bone in the lateral compartments of both knees with grade 4 changes and sclerosis in the lateral compartments.  He has tricompartmental osteophytes.  Impression: Advanced osteoarthrosis right and left knees.  Peripheral arterial disease with calcifications noted as well as bypass grafts and stents noted.       X-rays ordered and images interpreted today personally by me of AP pelvis and AP and lateral of right and left hips which show normal cartilage spaces and no evidence of osteoarthrosis of either hip.  Impression: As above.  Assessment: Primary osteoarthritis of right knee    Peripheral arterial disease    History of extremity bypass graft    Hypertension, unspecified type    Impaired gait and mobility    Pre-operative laboratory examination    Abnormal blood chemistry        Plan:  Robotic assisted right total knee arthroplasty without a tourniquet secondary to his peripheral arterial disease and bypass grafts  He is currently on Eliquis and Plavix and we will have this held prior to surgery and obtain cardiac clearance  and resume postoperatively    The patient has a history of cardiac disease and is at high risk for adverse effects of surgery and anesthesia. We will admit as inpatient and expect the patient to stay 2 nights in the hospital. We will closely monitor fluid intake and output, vital signs, neurological assessments and will use telemetry if needed. We plan for discharge once patient is stable and at baseline       Risks, benefits, alternatives, and complications were explained to the patient and/or patient representative. They understand, agree, and want to proceed with the operation/ procedure.        DISCLAIMER: This note may have been dictated using voice recognition software and may contain grammatical errors.     NOTE: Consult report sent to referring provider via [employer].

## 2023-03-23 ENCOUNTER — TELEPHONE (OUTPATIENT)
Dept: ORTHOPEDICS | Facility: CLINIC | Age: 63
End: 2023-03-23
Payer: MEDICARE

## 2023-03-23 NOTE — TELEPHONE ENCOUNTER
Spoke with Natacha at Dr. Rodríguez Pino's office about Patient's HgB results from pre-op.    Requested that they see patient to evaluate why Pt's HgB has continued to decrease over the past 7 months and to call Pt to set up appt.    Also informed her that we can send a referral to hematology per Dr. Barron if needed.    Faxing lab results to [phone]    Also faxing results to Lis at [facility]/Dr. Phipps since Pt has been seen for cardiac clearance. Fax is [phone]

## 2023-03-30 ENCOUNTER — OFFICE VISIT (OUTPATIENT)
Dept: NEUROLOGY | Facility: CLINIC | Age: 63
End: 2023-03-30
Payer: MEDICARE

## 2023-03-30 VITALS
DIASTOLIC BLOOD PRESSURE: 72 MMHG | WEIGHT: 187 LBS | HEIGHT: 65 IN | SYSTOLIC BLOOD PRESSURE: 116 MMHG | BODY MASS INDEX: 31.16 KG/M2

## 2023-03-30 DIAGNOSIS — I67.82 CHRONIC CEREBRAL ISCHEMIA: ICD-10-CM

## 2023-03-30 DIAGNOSIS — G40.009 LOCALIZATION-RELATED (FOCAL) (PARTIAL) IDIOPATHIC EPILEPSY AND EPILEPTIC SYNDROMES WITH SEIZURES OF LOCALIZED ONSET, NOT INTRACTABLE, WITHOUT STATUS EPILEPTICUS: Primary | ICD-10-CM

## 2023-03-30 DIAGNOSIS — I63.9 OCCIPITAL CEREBRAL INFARCTION: ICD-10-CM

## 2023-03-30 DIAGNOSIS — H53.462 HEMIANOPIA, HOMONYMOUS, LEFT: ICD-10-CM

## 2023-03-30 PROCEDURE — 99214 PR OFFICE/OUTPT VISIT, EST, LEVL IV, 30-39 MIN: ICD-10-PCS | Mod: S$GLB,,, | Performed by: SPECIALIST

## 2023-03-30 PROCEDURE — 3078F PR MOST RECENT DIASTOLIC BLOOD PRESSURE < 80 MM HG: ICD-10-PCS | Mod: CPTII,S$GLB,, | Performed by: SPECIALIST

## 2023-03-30 PROCEDURE — 99999 PR PBB SHADOW E&M-EST. PATIENT-LVL III: ICD-10-PCS | Mod: PBBFAC,,, | Performed by: SPECIALIST

## 2023-03-30 PROCEDURE — 1160F RVW MEDS BY RX/DR IN RCRD: CPT | Mod: CPTII,S$GLB,, | Performed by: SPECIALIST

## 2023-03-30 PROCEDURE — 99214 OFFICE O/P EST MOD 30 MIN: CPT | Mod: S$GLB,,, | Performed by: SPECIALIST

## 2023-03-30 PROCEDURE — 3008F PR BODY MASS INDEX (BMI) DOCUMENTED: ICD-10-PCS | Mod: CPTII,S$GLB,, | Performed by: SPECIALIST

## 2023-03-30 PROCEDURE — 3074F SYST BP LT 130 MM HG: CPT | Mod: CPTII,S$GLB,, | Performed by: SPECIALIST

## 2023-03-30 PROCEDURE — 1159F PR MEDICATION LIST DOCUMENTED IN MEDICAL RECORD: ICD-10-PCS | Mod: CPTII,S$GLB,, | Performed by: SPECIALIST

## 2023-03-30 PROCEDURE — 99999 PR PBB SHADOW E&M-EST. PATIENT-LVL III: CPT | Mod: PBBFAC,,, | Performed by: SPECIALIST

## 2023-03-30 PROCEDURE — 1159F MED LIST DOCD IN RCRD: CPT | Mod: CPTII,S$GLB,, | Performed by: SPECIALIST

## 2023-03-30 PROCEDURE — 3008F BODY MASS INDEX DOCD: CPT | Mod: CPTII,S$GLB,, | Performed by: SPECIALIST

## 2023-03-30 PROCEDURE — 1160F PR REVIEW ALL MEDS BY PRESCRIBER/CLIN PHARMACIST DOCUMENTED: ICD-10-PCS | Mod: CPTII,S$GLB,, | Performed by: SPECIALIST

## 2023-03-30 PROCEDURE — 3074F PR MOST RECENT SYSTOLIC BLOOD PRESSURE < 130 MM HG: ICD-10-PCS | Mod: CPTII,S$GLB,, | Performed by: SPECIALIST

## 2023-03-30 PROCEDURE — 3078F DIAST BP <80 MM HG: CPT | Mod: CPTII,S$GLB,, | Performed by: SPECIALIST

## 2023-03-30 RX ORDER — AZELASTINE 1 MG/ML
SPRAY, METERED NASAL
COMMUNITY

## 2023-03-30 NOTE — LETTER
March 30, 2023        Rodríguez Pino MD  [address]  [place] [address]             [facility] [address], [address]  [facility] [address]  Phone: [phone]   Patient: Ean Funes   MR Number: [id number]   YOB: 1960   Date of Visit: 3/30/2023     Dear Dr. Pino,     Attached records on mutual patient.     Sincerely,      Bashir Worrell MD            CC  No Recipients    Enclosure

## 2023-03-30 NOTE — PROGRESS NOTES
Subjective:      Subjective:         Patient ID: Ean Funes is a 62 y.o. male.    Chief Complaint: epilepsy/seizure follow up     HPI:           F/U Sz.-Stroke.  (Pts GF (Maris Ball) id here today. Pt states he has not had any stroke like symptoms or Sz like activity sine last OV. PT states he has not had a Sz since being on Keppra. Pt taking Keppra 500mb 1/2 tab BID and is tolerating it well. Pt drives. Pts GF states they decreased his Keppra to a 1/2 tab bid due to it was making the pt angry and irritated. )      notes may also be on facesheet for HPI, ROS, and other sections     Review of Systems  Sleep:  Depression:           Social History     Socioeconomic History    Marital status:    Tobacco Use    Smoking status: Every Day     Types: Cigarettes    Smokeless tobacco: Never   Substance and Sexual Activity    Alcohol use: Yes    Drug use: Never         Current Outpatient Medications:     azelastine (ASTELIN) 137 mcg (0.1 %) nasal spray, Astelin 137 mcg (0.1 %) nasal spray aerosol  Spray 2 sprays twice a day by intranasal route as directed for 30 days., Disp: , Rfl:     clopidogreL (PLAVIX) 75 mg tablet, Take 75 mg by mouth once daily., Disp: , Rfl:     ELIQUIS 5 mg Tab, Take 5 mg by mouth 2 (two) times daily., Disp: , Rfl:     famotidine (PEPCID) 40 MG tablet, SMARTSI Tablet(s) By Mouth Every Evening, Disp: , Rfl:     FEROSUL 325 mg (65 mg iron) Tab tablet, Take by mouth every morning., Disp: , Rfl:     fluticasone propionate (FLONASE) 50 mcg/actuation nasal spray, 1 spray by Nasal route., Disp: , Rfl:     levETIRAcetam (KEPPRA) 500 MG Tab, Take 1 tablet (500 mg total) by mouth 2 (two) times daily. (Patient taking differently: Take 500 mg by mouth 2 (two) times daily. 1/2 tab BID), Disp: 60 tablet, Rfl: 11    metoprolol tartrate (LOPRESSOR) 50 MG tablet, Take 50 mg by mouth 2 (two) times daily., Disp: , Rfl:     pantoprazole (PROTONIX) 40 MG tablet, Take 40 mg by mouth every morning.,  "Disp: , Rfl:     rosuvastatin (CRESTOR) 20 MG tablet, Take 20 mg by mouth every evening., Disp: , Rfl:     tadalafiL (CIALIS) 5 MG tablet, Take 5 mg by mouth once daily., Disp: , Rfl:     tamsulosin (FLOMAX) 0.4 mg Cap, Take 1 capsule by mouth every evening., Disp: , Rfl:     traZODone (DESYREL) 50 MG tablet, Take 50 mg by mouth every evening., Disp: , Rfl:     azelastine (ASTELIN) 137 mcg (0.1 %) nasal spray, 1 spray by Nasal route., Disp: , Rfl:     doxycycline (VIBRAMYCIN) 50 mg/5 mL Syrp, Take 50 mg by mouth., Disp: , Rfl:     ipratropium (ATROVENT) 42 mcg (0.06 %) nasal spray, 2 sprays 2 (two) times daily., Disp: , Rfl:      Objective:      Exam  /72   Ht 5' 5" (1.651 m)   Wt 84.8 kg (187 lb)   BMI 31.12 kg/m²     General:   If accompanied, by:_   Heart__ RRR    Neurological    Speech: ok   vis fields: L hemianopia   EOMs: ok   Motor: no gross deficits   coord:   Gait: unaided     Neuroimaging:  My comments:     Prior EEG:     Labs:    meds:      __ multiple issues/ diagnoses or problems [if not enumerated in note then discussed in encounter but chose to or failed to document]    complexity of data   __high ._mod   __ images and reports reviewed:  __ hx obtained from family or accompaniment:   __other studies reviewed   __studies ordered __   __studies considered or discussed but not ordered __  __DDx discussed __    risks  _.._high _mod   _._ seizure disorder with risks of recurrent seizures and risk of injury or death ( possible or definite)      _._ potentially high risk meds (and/or) CNS medications which may cause medical or behavioral side effects  _._ fall risk  _.._ driving discussed HE is reminded he should not be driving under any circumstances     __ diagnosis unclear or DDx wide making risk uncertain to high  __other:      MDM/Medical Decision Making used for CPT choice based on above elements:  __high ._moderate         Assessment/Plan:       Problem List Items Addressed This Visit       "    Neuro    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

## 2023-04-11 ENCOUNTER — TELEPHONE (OUTPATIENT)
Dept: ORTHOPEDICS | Facility: CLINIC | Age: 63
End: 2023-04-11

## 2023-04-11 ENCOUNTER — TELEPHONE (OUTPATIENT)
Dept: PAIN MEDICINE | Facility: CLINIC | Age: 63
End: 2023-04-11
Payer: MEDICARE

## 2023-04-11 NOTE — TELEPHONE ENCOUNTER
01:27pm - patient's wife Maris called asking if we received labs and to see if he will have a new sx date?     02:00pm- I called the patient back to let her know that we did receive the lab work and the results. I let her know that Brittany will get with Kaden and Dr. Barron (telephone encounter opened) and will call her back in a day or so with an update. She verbalized a clear understanding.

## 2023-04-11 NOTE — TELEPHONE ENCOUNTER
Zoey from Dr. Pino's wanted to confirm that we received Pt's last OV notes and surgical clearance for Pt.    Pt saw Dr. Pino on 3/28/23 from our referral due to low HgB. They transfused Pt on 4/5/23 with Pt's levels now being stable per Dr. Pino.    Zoey stated that they are still unsure why hemoglobin was so low so they are referring Pt back to Dr. Estrada, gastroenterologist, for an upper endoscopy and colonoscopy to r/o bleeding.    Pt had a diagnostic colonoscopy performed over summer of 2022 which was negative, and Dr. Pino also performed a fecal occult blood test which was also negative.    I informed Zoey that I would give notes and updates to both Dr. Barron and Kaden. If Dr. Barron felt the Pt can move forward with TKA, then we will reach out and have him scheduled. I also informed her that Dr. Barron might want the Pt to have all of this settled before operating as well.    Zoey verbalized understanding and will call with any questions or concerns.    Keeping encounter open at this time to document further if Dr. Barron decides he wants to operate on Pt in the next few months.

## 2023-04-24 ENCOUNTER — TELEPHONE (OUTPATIENT)
Dept: ORTHOPEDICS | Facility: CLINIC | Age: 63
End: 2023-04-24
Payer: MEDICARE

## 2023-04-24 DIAGNOSIS — M17.11 PRIMARY OSTEOARTHRITIS OF RIGHT KNEE: Primary | ICD-10-CM

## 2023-04-24 NOTE — TELEPHONE ENCOUNTER
Patient called regarding the lab results and blood count. He stated he wanted to know if we have an update with an appointment with Dr. Kwadwo Marie?    I called the patient back and spoke with his wife Maris. She stated that she would like to speak with Kaden because she would like answers and would like to receive an update.     I let her know that I will let Kaden know. She verbalized a clear understanding.

## 2023-04-25 NOTE — TELEPHONE ENCOUNTER
I spoke with Brittany and she stated that she would send a referral to Dr. Anne Marie regarding the issues fof the low HgB.     I sent the referral off and informed the patient that this is the latest update. I also let them know that Kaden will reach out to the patient on 04/26/23 to answer any questions that they may have for him.     I left all of this in a VM.

## 2023-05-03 ENCOUNTER — OFFICE VISIT (OUTPATIENT)
Dept: HEMATOLOGY/ONCOLOGY | Facility: CLINIC | Age: 63
End: 2023-05-03
Payer: MEDICARE

## 2023-05-03 VITALS
BODY MASS INDEX: 28.08 KG/M2 | OXYGEN SATURATION: 100 % | HEIGHT: 67 IN | SYSTOLIC BLOOD PRESSURE: 136 MMHG | WEIGHT: 178.88 LBS | DIASTOLIC BLOOD PRESSURE: 77 MMHG | HEART RATE: 57 BPM | RESPIRATION RATE: 14 BRPM | TEMPERATURE: 98 F

## 2023-05-03 DIAGNOSIS — D64.9 ANEMIA, UNSPECIFIED TYPE: Primary | ICD-10-CM

## 2023-05-03 DIAGNOSIS — D50.0 IRON DEFICIENCY ANEMIA DUE TO CHRONIC BLOOD LOSS: ICD-10-CM

## 2023-05-03 DIAGNOSIS — D51.3 OTHER DIETARY VITAMIN B12 DEFICIENCY ANEMIA: ICD-10-CM

## 2023-05-03 DIAGNOSIS — D69.6 THROMBOCYTOPENIA: ICD-10-CM

## 2023-05-03 DIAGNOSIS — M17.11 PRIMARY OSTEOARTHRITIS OF RIGHT KNEE: ICD-10-CM

## 2023-05-03 PROBLEM — D50.9 IRON DEFICIENCY ANEMIA: Status: ACTIVE | Noted: 2023-05-03

## 2023-05-03 LAB
ADDITIONAL FINDINGS (OHS): ABNORMAL
ANISOCYTOSIS BLD QL SMEAR: ABNORMAL
BASOPHILS # BLD AUTO: 0.02 X10(3)/MCL
BASOPHILS NFR BLD AUTO: 0.4 %
DIRECT COOMBS (DAT): NORMAL
EOSINOPHIL # BLD AUTO: 0.05 X10(3)/MCL (ref 0–0.9)
EOSINOPHIL NFR BLD AUTO: 0.9 %
ERYTHROCYTE [DISTWIDTH] IN BLOOD BY AUTOMATED COUNT: ABNORMAL %
FERRITIN SERPL-MCNC: 81.51 NG/ML (ref 21.81–274.66)
FOLATE SERPL-MCNC: 9.5 NG/ML (ref 7–31.4)
HAPTOGLOB SERPL-MCNC: 114 MG/DL (ref 40–368)
HCT VFR BLD AUTO: 41.7 % (ref 42–52)
HGB BLD-MCNC: 12.7 G/DL (ref 14–18)
HYPOCHROMIA BLD QL SMEAR: ABNORMAL
IMM GRANULOCYTES # BLD AUTO: 0.01 X10(3)/MCL (ref 0–0.04)
IMM GRANULOCYTES NFR BLD AUTO: 0.2 %
IRON SATN MFR SERPL: ABNORMAL %
IRON SERPL-MCNC: 352 UG/DL (ref 65–175)
LDH SERPL-CCNC: 138 U/L (ref 125–220)
LYMPHOCYTES # BLD AUTO: 1.89 X10(3)/MCL (ref 0.6–4.6)
LYMPHOCYTES NFR BLD AUTO: 33.3 %
MACROCYTES BLD QL SMEAR: ABNORMAL
MCH RBC QN AUTO: 28.9 PG (ref 27–31)
MCHC RBC AUTO-ENTMCNC: 30.5 G/DL (ref 33–36)
MCV RBC AUTO: 95 FL (ref 80–94)
MICROCYTES BLD QL SMEAR: ABNORMAL
MONOCYTES # BLD AUTO: 0.47 X10(3)/MCL (ref 0.1–1.3)
MONOCYTES NFR BLD AUTO: 8.3 %
NEUTROPHILS # BLD AUTO: 3.24 X10(3)/MCL (ref 2.1–9.2)
NEUTROPHILS NFR BLD AUTO: 56.9 %
OVALOCYTES (OLG): SLIGHT
PLATELET # BLD AUTO: 104 X10(3)/MCL (ref 130–400)
PLATELET # BLD EST: ADEQUATE 10*3/UL
PMV BLD AUTO: 9.2 FL (ref 7.4–10.4)
POIKILOCYTOSIS BLD QL SMEAR: ABNORMAL
RBC # BLD AUTO: 4.39 X10(6)/MCL (ref 4.7–6.1)
RBC MORPH BLD: ABNORMAL
RET# (OHS): 0.05 (ref 0.03–0.1)
RETICULOCYTE COUNT AUTOMATED (OLG): 1.02 % (ref 1.1–2.1)
TIBC SERPL-MCNC: <25 UG/DL (ref 69–240)
TIBC SERPL-MCNC: <377 UG/DL (ref 250–450)
TRANSFERRIN SERPL-MCNC: 275 MG/DL (ref 163–344)
VIT B12 SERPL-MCNC: 278 PG/ML (ref 213–816)
WBC # SPEC AUTO: 5.68 X10(3)/MCL (ref 4.5–11.5)

## 2023-05-03 PROCEDURE — 3078F PR MOST RECENT DIASTOLIC BLOOD PRESSURE < 80 MM HG: ICD-10-PCS | Mod: CPTII,S$GLB,, | Performed by: INTERNAL MEDICINE

## 2023-05-03 PROCEDURE — 3008F BODY MASS INDEX DOCD: CPT | Mod: CPTII,S$GLB,, | Performed by: INTERNAL MEDICINE

## 2023-05-03 PROCEDURE — 85025 COMPLETE CBC W/AUTO DIFF WBC: CPT | Performed by: INTERNAL MEDICINE

## 2023-05-03 PROCEDURE — 3075F PR MOST RECENT SYSTOLIC BLOOD PRESS GE 130-139MM HG: ICD-10-PCS | Mod: CPTII,S$GLB,, | Performed by: INTERNAL MEDICINE

## 2023-05-03 PROCEDURE — 1159F PR MEDICATION LIST DOCUMENTED IN MEDICAL RECORD: ICD-10-PCS | Mod: CPTII,S$GLB,, | Performed by: INTERNAL MEDICINE

## 2023-05-03 PROCEDURE — 3075F SYST BP GE 130 - 139MM HG: CPT | Mod: CPTII,S$GLB,, | Performed by: INTERNAL MEDICINE

## 2023-05-03 PROCEDURE — 99204 PR OFFICE/OUTPT VISIT, NEW, LEVL IV, 45-59 MIN: ICD-10-PCS | Mod: S$GLB,,, | Performed by: INTERNAL MEDICINE

## 2023-05-03 PROCEDURE — 82728 ASSAY OF FERRITIN: CPT | Performed by: INTERNAL MEDICINE

## 2023-05-03 PROCEDURE — 82607 VITAMIN B-12: CPT | Performed by: INTERNAL MEDICINE

## 2023-05-03 PROCEDURE — 82746 ASSAY OF FOLIC ACID SERUM: CPT | Performed by: INTERNAL MEDICINE

## 2023-05-03 PROCEDURE — 86880 COOMBS TEST DIRECT: CPT | Performed by: INTERNAL MEDICINE

## 2023-05-03 PROCEDURE — 83010 ASSAY OF HAPTOGLOBIN QUANT: CPT | Performed by: INTERNAL MEDICINE

## 2023-05-03 PROCEDURE — 85060 BLOOD SMEAR INTERPRETATION: CPT | Performed by: INTERNAL MEDICINE

## 2023-05-03 PROCEDURE — 1160F RVW MEDS BY RX/DR IN RCRD: CPT | Mod: CPTII,S$GLB,, | Performed by: INTERNAL MEDICINE

## 2023-05-03 PROCEDURE — 83550 IRON BINDING TEST: CPT | Performed by: INTERNAL MEDICINE

## 2023-05-03 PROCEDURE — 1159F MED LIST DOCD IN RCRD: CPT | Mod: CPTII,S$GLB,, | Performed by: INTERNAL MEDICINE

## 2023-05-03 PROCEDURE — 99204 OFFICE O/P NEW MOD 45 MIN: CPT | Mod: S$GLB,,, | Performed by: INTERNAL MEDICINE

## 2023-05-03 PROCEDURE — 85045 AUTOMATED RETICULOCYTE COUNT: CPT | Performed by: INTERNAL MEDICINE

## 2023-05-03 PROCEDURE — 3008F PR BODY MASS INDEX (BMI) DOCUMENTED: ICD-10-PCS | Mod: CPTII,S$GLB,, | Performed by: INTERNAL MEDICINE

## 2023-05-03 PROCEDURE — 99999 PR PBB SHADOW E&M-EST. PATIENT-LVL V: CPT | Mod: PBBFAC,,, | Performed by: INTERNAL MEDICINE

## 2023-05-03 PROCEDURE — 36415 COLL VENOUS BLD VENIPUNCTURE: CPT | Performed by: INTERNAL MEDICINE

## 2023-05-03 PROCEDURE — 83615 LACTATE (LD) (LDH) ENZYME: CPT | Performed by: INTERNAL MEDICINE

## 2023-05-03 PROCEDURE — 99999 PR PBB SHADOW E&M-EST. PATIENT-LVL V: ICD-10-PCS | Mod: PBBFAC,,, | Performed by: INTERNAL MEDICINE

## 2023-05-03 PROCEDURE — 3078F DIAST BP <80 MM HG: CPT | Mod: CPTII,S$GLB,, | Performed by: INTERNAL MEDICINE

## 2023-05-03 PROCEDURE — 1160F PR REVIEW ALL MEDS BY PRESCRIBER/CLIN PHARMACIST DOCUMENTED: ICD-10-PCS | Mod: CPTII,S$GLB,, | Performed by: INTERNAL MEDICINE

## 2023-05-03 NOTE — PROGRESS NOTES
Referring physician: Dr. Israel Barron  Reason for referral: Anemia      Subjective:       Patient ID: Ean Funes is a 62 y.o. male.      GI: Dr. Damon Estrada  PCP: Dr. Rodríguez Pino  ENT: Dr. Liliya Nix    1. Iron Deficiency Anemia--Diagnosed 8/2022    2. 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    Treatment history:  Blood transfusion 2 units 8/2022  Blood transfusion 2 units 4/2023    Procedures:  EGD 8/18/22--Essentially normal esophagogastroduodenoscopy to nearly the full extent of the scope. No clear evidence for portal hypertension or reflux esophagitis, relatively normal cardia and fundus, normal body and antrum without erosion or ulcer, fairly straightforward small bowel mucosa to the distal duodenum. No blood, no bleed source evident.  Colonoscopy 8/18/22--Normal digital rectal exam, 6 mm polyp in the rectum addressed with a hot snare and submitted  3. 8 or 9 mm polyp at 30 cm in the sigmoid, relatively narrow attachment, status post hot snare followed by a single Hemoclip, tissue submitted, 3 polyps in the ascending colon measuring 3 or 4 mm addressed with a cold snare and submitted together, Normal cecal pole good visualization of the cecum and a normal terminal ileumNormal digital rectal exam, 6 mm polyp in the rectum addressed with a hot snare and submitted, 8 or 9 mm polyp at 30 cm in the sigmoid, relatively narrow attachment, status post hot snare followed by a single Hemoclip, tissue submitted, 3 polyps in the ascending colon measuring 3 or 4 mm addressed with a cold snare and submitted together, normal cecal pole good visualization of the cecum and a normal terminal  ileum. Polyps all benign on pathology.    Current treatment: Oral iron daily    Chief Complaint: Other Misc (NPH)    HPI  61 yo male with PMH of PVD, CAD, on chronic Plavix and Eliquis, Floor of mouth cancer from 11/2022, presents for evaluation of anemia. Patient was found to be severely anemic in 8/2022 after starting on Eliquis in 1/2022. He was hospitalized at [facility] and had EGD/colonoscopy that did not show any obvious source of bleeding. He required blood transfusion at that time and stool was positive for occult blood. Patient was supposed to have a VCE outpatient but was told the GI Dr. Estrada did not take his insurance. Patient was found again on labs to have recurrent severe anemia recently in 4/2023 and given another 2 units of blood and placed on oral iron. Patient was scheduled to undergo knee surgery but was told he was still too anemic at his last appointment. Patient denies any BRBPR or melena. Also denies any hematuria. He bruises very easily and bleeds for awhile and gets skin tears easily. Of note, he was also diagnosed with floor of mouth cancer in 11/2022 for which he underwent surgery and is just on observation by ENT in [place] Dr. Nix. Patient denies any fatigue.     Past Medical History:   Diagnosis Date    Hypertension       Past Surgical History:   Procedure Laterality Date    CARDIAC SURGERY      MOUTH SURGERY      Removed cancer in mouth     Family History   Family history unknown: Yes     Social History     Socioeconomic History    Marital status:    Tobacco Use    Smoking status: Every Day     Packs/day: 1.00     Types: Cigarettes    Smokeless tobacco: Never   Substance and Sexual Activity    Alcohol use: Yes    Drug use: Never       Review of patient's allergies indicates:   Allergen Reactions    Strawberry Anaphylaxis, Itching and Other (See Comments)     Other reaction(s): Hives, Throat closes      Current Outpatient Medications on File Prior to Visit   Medication Sig  Dispense Refill    azelastine (ASTELIN) 137 mcg (0.1 %) nasal spray 1 spray by Nasal route.      clopidogreL (PLAVIX) 75 mg tablet Take 75 mg by mouth once daily.      ELIQUIS 5 mg Tab Take 5 mg by mouth 2 (two) times daily.      famotidine (PEPCID) 40 MG tablet SMARTSI Tablet(s) By Mouth Every Evening      FEROSUL 325 mg (65 mg iron) Tab tablet Take by mouth every morning.      fluticasone propionate (FLONASE) 50 mcg/actuation nasal spray 1 spray by Nasal route.      ipratropium (ATROVENT) 42 mcg (0.06 %) nasal spray 2 sprays 2 (two) times daily.      levETIRAcetam (KEPPRA) 500 MG Tab Take 1 tablet (500 mg total) by mouth 2 (two) times daily. (Patient taking differently: Take 500 mg by mouth 2 (two) times daily. 1/2 tab BID) 60 tablet 11    metoprolol tartrate (LOPRESSOR) 50 MG tablet Take 50 mg by mouth 2 (two) times daily.      pantoprazole (PROTONIX) 40 MG tablet Take 40 mg by mouth every morning.      rosuvastatin (CRESTOR) 20 MG tablet Take 20 mg by mouth every evening.      tadalafiL (CIALIS) 5 MG tablet Take 5 mg by mouth once daily.      tamsulosin (FLOMAX) 0.4 mg Cap Take 1 capsule by mouth every evening.      traZODone (DESYREL) 50 MG tablet Take 50 mg by mouth every evening.      azelastine (ASTELIN) 137 mcg (0.1 %) nasal spray Astelin 137 mcg (0.1 %) nasal spray aerosol   Spray 2 sprays twice a day by intranasal route as directed for 30 days.      doxycycline (VIBRAMYCIN) 50 mg/5 mL Syrp Take 50 mg by mouth.       No current facility-administered medications on file prior to visit.      Review of Systems   Constitutional:  Negative for appetite change, fatigue, fever and unexpected weight change.   HENT:  Negative for mouth sores.    Eyes: Negative.    Respiratory:  Negative for cough and shortness of breath.    Cardiovascular:  Negative for chest pain and leg swelling.   Gastrointestinal:  Negative for abdominal distention, abdominal pain, constipation, diarrhea, nausea, vomiting and reflux.  "  Genitourinary:  Negative for difficulty urinating, dysuria and hematuria.   Musculoskeletal:  Negative for arthralgias and back pain.   Integumentary:  Negative for rash.   Neurological:  Negative for weakness and headaches.   Hematological:  Negative for adenopathy.   Psychiatric/Behavioral:  Negative for sleep disturbance. The patient is not nervous/anxious.             Vitals:    05/03/23 1329   BP: 136/77   Pulse: (!) 57   Resp: 14   Temp: 98.1 °F (36.7 °C)   SpO2: 100%   Weight: 81.1 kg (178 lb 14.4 oz)   Height: 5' 7.2" (1.707 m)      Physical Exam  Constitutional:       General: He is awake.      Appearance: Normal appearance.   HENT:      Head: Normocephalic and atraumatic.      Nose: Nose normal.      Mouth/Throat:      Mouth: Mucous membranes are moist.   Eyes:      General: Vision grossly intact.      Extraocular Movements: Extraocular movements intact.      Conjunctiva/sclera: Conjunctivae normal.   Neck:      Comments: Scars from neck dissection on left, no palpable adenopathy  Cardiovascular:      Rate and Rhythm: Normal rate and regular rhythm.      Heart sounds: Normal heart sounds.   Pulmonary:      Effort: Pulmonary effort is normal.      Breath sounds: Normal breath sounds.   Abdominal:      General: Bowel sounds are normal. There is no distension.      Palpations: Abdomen is soft.      Tenderness: There is no abdominal tenderness.   Musculoskeletal:      Cervical back: Normal range of motion and neck supple.      Right lower leg: No edema.      Left lower leg: No edema.   Lymphadenopathy:      Cervical: No cervical adenopathy.      Upper Body:      Right upper body: No supraclavicular adenopathy.      Left upper body: No supraclavicular adenopathy.   Skin:     General: Skin is warm.      Comments: Multiple bruises on arms bilaterally with skin tears   Neurological:      Mental Status: He is alert and oriented to person, place, and time.      Motor: Motor function is intact.   Psychiatric:       "   Mood and Affect: Mood normal.         Speech: Speech normal.         Behavior: Behavior is cooperative.         Judgment: Judgment normal.     Office Visit on 05/03/2023   Component Date Value Ref Range Status    Ferritin Level 05/03/2023 81.51  21.81 - 274.66 ng/mL Final    Direct Efren (MILLIE) 05/03/2023 NEG   Final    Lactate Dehydrogenase 05/03/2023 138  125 - 220 U/L Final    Iron Binding Capacity Unsaturated 05/03/2023 <25 (L)  69 - 240 ug/dL Final    Iron Level 05/03/2023 352 (H)  65 - 175 ug/dL Final    Transferrin 05/03/2023 275  163 - 344 mg/dL Final    Iron Binding Capacity Total 05/03/2023 <377  250 - 450 ug/dL Final    Iron Saturation 05/03/2023    Final    Unable to calculate     Retic Cnt Auto 05/03/2023 1.02 (L)  1.1 - 2.1 % Final    RET# 05/03/2023 0.0459  0.026 - 0.095 Final    Haptoglobin 05/03/2023 114  40 - 368 mg/dL Final    Folate Level 05/03/2023 9.5  7.0 - 31.4 ng/mL Final    Vitamin B12 Level 05/03/2023 278  213 - 816 pg/mL Final    WBC 05/03/2023 5.68  4.50 - 11.50 x10(3)/mcL Final    RBC 05/03/2023 4.39 (L)  4.70 - 6.10 x10(6)/mcL Final    Hgb 05/03/2023 12.7 (L)  14.0 - 18.0 g/dL Final    Hct 05/03/2023 41.7 (L)  42.0 - 52.0 % Final    MCV 05/03/2023 95.0 (H)  80.0 - 94.0 fL Final    MCH 05/03/2023 28.9  27.0 - 31.0 pg Final    MCHC 05/03/2023 30.5 (L)  33.0 - 36.0 g/dL Final    RDW 05/03/2023    Final    Unable to calculate RDW    Platelet 05/03/2023 104 (L)  130 - 400 x10(3)/mcL Final    MPV 05/03/2023 9.2  7.4 - 10.4 fL Final    Neut % 05/03/2023 56.9  % Final    Lymph % 05/03/2023 33.3  % Final    Mono % 05/03/2023 8.3  % Final    Eos % 05/03/2023 0.9  % Final    Basophil % 05/03/2023 0.4  % Final    Lymph # 05/03/2023 1.89  0.6 - 4.6 x10(3)/mcL Final    Neut # 05/03/2023 3.24  2.1 - 9.2 x10(3)/mcL Final    Mono # 05/03/2023 0.47  0.1 - 1.3 x10(3)/mcL Final    Eos # 05/03/2023 0.05  0 - 0.9 x10(3)/mcL Final    Baso # 05/03/2023 0.02  <=0.2 x10(3)/mcL Final    IG# 05/03/2023 0.01  0  - 0.04 x10(3)/mcL Final    IG% 05/03/2023 0.2  % Final         Assessment:       1. Anemia, unspecified type    2. Primary osteoarthritis of right knee    3. Iron deficiency anemia due to chronic blood loss    4. Other dietary vitamin B12 deficiency anemia    5. Thrombocytopenia         Plan:       Patient with iron deficiency anemia, recurrent. He is on Plavix and Eliquis for PVD and CAD. He does not give h/o Afib.  Will obtain cardiologist note.  He was given a blood transfusion in 4/2023 and started on oral iron with much improvement.  Labs today with Hgb much improved 12.7g/dL, platelets are slightly low at 104K. Unsure what is causing this, but may be chronic ITP vs. B12 deficiency.  B12 is on the low side as well.    Initially I had recommended IV iron, but his ferritin is normal and other iron levels more c/w anemia in chronic disease.   Would recommend to start sublingual B12 1000mcg daily.  Will have patient continue oral iron as well.  RTC 4 weeks for follow-up with repeat labs.  Will check platelet antibody levels at that time as well.    As far as from my standpoint, patient can proceed with his knee surgery.  He has been cleared by cardiologist to get off the Plavix and Eliquis for surgery.    Patient had prior GI work-up with unremarkable EGD/colonoscopy. Will send referral back to Dr. Estrada for capsule study.  May can also consider getting off Protonix since this can cause malabsorption.     All questions answered at this time.       Anne Marie MD

## 2023-05-04 DIAGNOSIS — M17.11 PRIMARY OSTEOARTHRITIS OF RIGHT KNEE: Primary | ICD-10-CM

## 2023-05-04 LAB — HEMATOLOGIST REVIEW: NORMAL

## 2023-05-09 ENCOUNTER — PATIENT MESSAGE (OUTPATIENT)
Dept: ORTHOPEDICS | Facility: CLINIC | Age: 63
End: 2023-05-09
Payer: MEDICARE

## 2023-05-09 NOTE — TELEPHONE ENCOUNTER
Pt has been cleared by Dr. Marie to proceed with Rt TKA [facility].    Dr. Barron has an availability for a case on TR 6/29/23. Spoke to Pt's partner who stated they could do that date but they are leaving for [place] on 9/2/23 and wanted to be sure Dr. Barron is ok with them taking a month long trip so soon after his surgery.    I informed Pt's partner that I would go ahead and send the case request to Kaden, and schedule the pre-op appt with Dr. Barron on 6/5/23 at 8:45 a.m.and would speak to Dr. Barron about their trip and get back with her.    Pt verbalized understanding and will await a call back.

## 2023-05-10 NOTE — TELEPHONE ENCOUNTER
I spoke with Dr. Barron who stated he would like for Pt to hold off on surgery until he and his partner are back from [place] at the beginning of October due to Pt's health concerns and needing Pt at 3 month healing time.    I called Pt 's partner to let her know but had to LVM.    Will attempt to call Pt back again on 5/11/23.

## 2023-05-22 ENCOUNTER — TELEPHONE (OUTPATIENT)
Dept: HEMATOLOGY/ONCOLOGY | Facility: CLINIC | Age: 63
End: 2023-05-22
Payer: MEDICARE

## 2023-05-23 ENCOUNTER — PATIENT MESSAGE (OUTPATIENT)
Dept: ORTHOPEDICS | Facility: CLINIC | Age: 63
End: 2023-05-23
Payer: MEDICARE

## 2023-05-23 ENCOUNTER — TELEPHONE (OUTPATIENT)
Dept: ORTHOPEDICS | Facility: CLINIC | Age: 63
End: 2023-05-23
Payer: MEDICARE

## 2023-05-23 NOTE — TELEPHONE ENCOUNTER
Patient has been scheduled for Rt TKA [facility] on 10/31/23, with pre-op on 10/11/23.    Pt is also scheduled for bilateral cortisone injections on 8/23/23 at 12:45 p.m.    Sending case request to Kaden and Yogi Pedroza.

## 2023-05-23 NOTE — TELEPHONE ENCOUNTER
Patient has been scheduled for Rt TKA [facility] on 10/31/23, with pre-op on 10/11/23.     Pt is also scheduled for bilateral cortisone injections on 8/23/23 at 12:45 p.m.     Sending case request to Kaden and Yogi Pedroza.

## 2023-05-23 NOTE — TELEPHONE ENCOUNTER
Patient's significant other (Maris) called stating that she would like to discuss the options for injection and for surgery in the patient's right knee.

## 2023-05-31 NOTE — PROGRESS NOTES
Subjective:       Patient ID: Ean Funes is a 62 y.o. male.    Orthopedics: Dr. Israel Barron  GI: Dr. Damon Estrada  PCP: Dr. Rodríguez Pino  ENT: Dr. Liliya Nix    1. Iron Deficiency Anemia--Diagnosed 8/2022    2. 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    Treatment history:  Blood transfusion 2 units 8/2022  Blood transfusion 2 units 4/2023    Procedures:  1. EGD 8/18/22--Essentially normal esophagogastroduodenoscopy to nearly the full extent of the scope. No clear evidence for portal hypertension or reflux esophagitis, relatively normal cardia and fundus, normal body and antrum without erosion or ulcer, fairly straightforward small bowel mucosa to the distal duodenum. No blood, no bleed source evident.  2. Colonoscopy 8/18/22--Normal digital rectal exam, 6 mm polyp in the rectum addressed with a hot snare and submitted, 8 or 9 mm polyp at 30 cm in the sigmoid, relatively narrow attachment, status post hot snare followed by a single Hemoclip, tissue submitted, 3 polyps in the ascending colon measuring 3 or 4 mm addressed with a cold snare and submitted together, Normal cecal pole good visualization of the cecum and a normal terminal ileumNormal digital rectal exam, 6 mm polyp in the rectum addressed with a hot snare and submitted, 8 or 9 mm polyp at 30 cm in the sigmoid, relatively narrow attachment, status post hot snare followed by a single Hemoclip, tissue submitted, 3 polyps in the ascending colon measuring 3 or 4 mm addressed with a cold snare and submitted together, normal cecal pole good visualization of the cecum and a normal terminal ileum. Polyps all benign on  pathology.    Current treatment:   Oral iron daily  Vitamin B12 1000mcg oral daily started    Chief Complaint: Other Misc (Pt reports no new concerns today.)    HPI  Patient is here for follow-up of anemia. He is doing well. No complaints. No bleeding reported. Anemia is further improved per labs. B12 now normal. Patient has delayed his knee surgery until 10/31/23 until he returns from month long trip.    Past Medical History:   Diagnosis Date    Hypertension       Past Surgical History:   Procedure Laterality Date    CARDIAC SURGERY      MOUTH SURGERY      Removed cancer in mouth     Family History   Family history unknown: Yes     Social History     Socioeconomic History    Marital status:    Tobacco Use    Smoking status: Every Day     Packs/day: 1.00     Types: Cigarettes    Smokeless tobacco: Never   Substance and Sexual Activity    Alcohol use: Yes    Drug use: Never       Review of patient's allergies indicates:   Allergen Reactions    Strawberry Anaphylaxis, Itching and Other (See Comments)     Other reaction(s): Hives, Throat closes      Current Outpatient Medications on File Prior to Visit   Medication Sig Dispense Refill    azelastine (ASTELIN) 137 mcg (0.1 %) nasal spray 1 spray by Nasal route.      azelastine (ASTELIN) 137 mcg (0.1 %) nasal spray Astelin 137 mcg (0.1 %) nasal spray aerosol   Spray 2 sprays twice a day by intranasal route as directed for 30 days.      clopidogreL (PLAVIX) 75 mg tablet Take 75 mg by mouth once daily.      ELIQUIS 5 mg Tab Take 5 mg by mouth 2 (two) times daily.      famotidine (PEPCID) 40 MG tablet SMARTSI Tablet(s) By Mouth Every Evening      FEROSUL 325 mg (65 mg iron) Tab tablet Take by mouth every morning.      fluticasone propionate (FLONASE) 50 mcg/actuation nasal spray 1 spray by Nasal route.      ipratropium (ATROVENT) 42 mcg (0.06 %) nasal spray 2 sprays 2 (two) times daily.      levETIRAcetam (KEPPRA) 500 MG Tab Take 1 tablet (500 mg total) by mouth 2  "(two) times daily. (Patient taking differently: Take 500 mg by mouth 2 (two) times daily. 1/2 tab BID) 60 tablet 11    metoprolol tartrate (LOPRESSOR) 50 MG tablet Take 50 mg by mouth 2 (two) times daily.      pantoprazole (PROTONIX) 40 MG tablet Take 40 mg by mouth every morning.      rosuvastatin (CRESTOR) 20 MG tablet Take 20 mg by mouth every evening.      tadalafiL (CIALIS) 5 MG tablet Take 5 mg by mouth once daily.      tamsulosin (FLOMAX) 0.4 mg Cap Take 1 capsule by mouth every evening.      traZODone (DESYREL) 50 MG tablet Take 50 mg by mouth every evening.      doxycycline (VIBRAMYCIN) 50 mg/5 mL Syrp Take 50 mg by mouth.       No current facility-administered medications on file prior to visit.      Review of Systems   Constitutional:  Negative for appetite change, fatigue, fever and unexpected weight change.   HENT:  Negative for mouth sores.    Eyes: Negative.    Respiratory:  Negative for cough and shortness of breath.    Cardiovascular:  Negative for chest pain and leg swelling.   Gastrointestinal:  Negative for abdominal distention, abdominal pain, constipation, diarrhea, nausea, vomiting and reflux.   Genitourinary:  Negative for difficulty urinating, dysuria and hematuria.   Musculoskeletal:  Negative for arthralgias and back pain.   Integumentary:  Negative for rash.   Neurological:  Negative for weakness and headaches.   Hematological:  Negative for adenopathy.   Psychiatric/Behavioral:  Negative for sleep disturbance. The patient is not nervous/anxious.        Vitals:    06/07/23 1040   BP: 134/73   Pulse: 66   Resp: 14   Temp: 98.1 °F (36.7 °C)   TempSrc: Oral   SpO2: 96%   Weight: 81.2 kg (179 lb)   Height: 5' 7.2" (1.707 m)        Physical Exam  Constitutional:       General: He is awake.      Appearance: Normal appearance.   HENT:      Head: Normocephalic and atraumatic.      Nose: Nose normal.      Mouth/Throat:      Mouth: Mucous membranes are moist.   Eyes:      General: Vision grossly " intact.      Extraocular Movements: Extraocular movements intact.      Conjunctiva/sclera: Conjunctivae normal.   Neck:      Comments: Scars from neck dissection on left, no palpable adenopathy  Cardiovascular:      Rate and Rhythm: Normal rate and regular rhythm.      Heart sounds: Normal heart sounds.   Pulmonary:      Effort: Pulmonary effort is normal.      Breath sounds: Normal breath sounds.   Abdominal:      General: Bowel sounds are normal. There is no distension.      Palpations: Abdomen is soft.      Tenderness: There is no abdominal tenderness.   Musculoskeletal:      Cervical back: Normal range of motion and neck supple.      Right lower leg: No edema.      Left lower leg: No edema.   Lymphadenopathy:      Cervical: No cervical adenopathy.      Upper Body:      Right upper body: No supraclavicular adenopathy.      Left upper body: No supraclavicular adenopathy.   Skin:     General: Skin is warm.      Comments: Multiple bruises on arms bilaterally with skin tears   Neurological:      Mental Status: He is alert and oriented to person, place, and time.      Motor: Motor function is intact.   Psychiatric:         Mood and Affect: Mood normal.         Speech: Speech normal.         Behavior: Behavior is cooperative.         Judgment: Judgment normal.     No visits with results within 1 Day(s) from this visit.   Latest known visit with results is:   Office Visit on 05/03/2023   Component Date Value Ref Range Status    Ferritin Level 05/03/2023 81.51  21.81 - 274.66 ng/mL Final    Direct Efren (MILLIE) 05/03/2023 NEG   Final    Lactate Dehydrogenase 05/03/2023 138  125 - 220 U/L Final    Iron Binding Capacity Unsaturated 05/03/2023 <25 (L)  69 - 240 ug/dL Final    Iron Level 05/03/2023 352 (H)  65 - 175 ug/dL Final    Transferrin 05/03/2023 275  163 - 344 mg/dL Final    Iron Binding Capacity Total 05/03/2023 <377  250 - 450 ug/dL Final    Iron Saturation 05/03/2023    Final    Unable to calculate     Retic Cnt  Auto 05/03/2023 1.02 (L)  1.1 - 2.1 % Final    RET# 05/03/2023 0.0459  0.026 - 0.095 Final    Haptoglobin 05/03/2023 114  40 - 368 mg/dL Final    Folate Level 05/03/2023 9.5  7.0 - 31.4 ng/mL Final    Vitamin B12 Level 05/03/2023 278  213 - 816 pg/mL Final    WBC 05/03/2023 5.68  4.50 - 11.50 x10(3)/mcL Final    RBC 05/03/2023 4.39 (L)  4.70 - 6.10 x10(6)/mcL Final    Hgb 05/03/2023 12.7 (L)  14.0 - 18.0 g/dL Final    Hct 05/03/2023 41.7 (L)  42.0 - 52.0 % Final    MCV 05/03/2023 95.0 (H)  80.0 - 94.0 fL Final    MCH 05/03/2023 28.9  27.0 - 31.0 pg Final    MCHC 05/03/2023 30.5 (L)  33.0 - 36.0 g/dL Final    RDW 05/03/2023    Final    Unable to calculate RDW    Platelet 05/03/2023 104 (L)  130 - 400 x10(3)/mcL Final    MPV 05/03/2023 9.2  7.4 - 10.4 fL Final    Neut % 05/03/2023 56.9  % Final    Lymph % 05/03/2023 33.3  % Final    Mono % 05/03/2023 8.3  % Final    Eos % 05/03/2023 0.9  % Final    Basophil % 05/03/2023 0.4  % Final    Lymph # 05/03/2023 1.89  0.6 - 4.6 x10(3)/mcL Final    Neut # 05/03/2023 3.24  2.1 - 9.2 x10(3)/mcL Final    Mono # 05/03/2023 0.47  0.1 - 1.3 x10(3)/mcL Final    Eos # 05/03/2023 0.05  0 - 0.9 x10(3)/mcL Final    Baso # 05/03/2023 0.02  <=0.2 x10(3)/mcL Final    IG# 05/03/2023 0.01  0 - 0.04 x10(3)/mcL Final    IG% 05/03/2023 0.2  % Final    RBC Morph 05/03/2023 Abnormal (A)  Normal Final    Anisocyte 05/03/2023 2+ (A)  (none) Final    Hypochrom 05/03/2023 1+ (A)  (none) Final    Macrocyte 05/03/2023 1+ (A)  (none) Final    Microcyte 05/03/2023 1+ (A)  (none) Final    Ovalocytes 05/03/2023 Slight (A)  (none) Final    Poik 05/03/2023 1+ (A)  (none) Final    Additional Findings 05/03/2023 Dimorphic RBC population   Final    Platelet Est 05/03/2023 Adequate  Normal, Adequate Final    Peripheral Smear Evaluation 05/03/2023    Final                    Value:- Normocytic, normochromic anemia.  - Mild Thrombocytopenia.    Impression: Normocytic anemia can be seen in early iron deficiency  anemia, anemia of chronic disease and acute blood loss. Thrombocytopenia can be due to decreased production, increased destruction (immune and non-immune mediated) or due to splenic sequestration.    Raymundo Bull M.D.      Labs from [facility] done 6/2/23--WBC 5.4, H/H 13.6/38.6, Plt 114, vitamin B12 517, iron 84, TIBC 311, ferritin 40.      Assessment:       1. Iron deficiency anemia due to chronic blood loss    2. Other dietary vitamin B12 deficiency anemia    3. Thrombocytopenia        Plan:       Patient with iron deficiency anemia, recurrent. Patient is on Plavix and Eliquis for PVD and CAD. He does not give h/o Afib.  He was given a blood transfusion in 4/2023 and started on oral iron with much improvement.  Also found to be B12 deficient in 5/2023 and started on oral B12.    Patient with mild thrombocytopenia, suspect chronic ITP.  CBCdiff recent with improved anemia Hgb 13.6g/dL and improved platelets 114K. B12 level now normal and ferritin remains low-normal.  Patient to continue on oral iron and oral B12.  His knee surgery has been delayed until 10/31/23 when they return from their trip.    Will have patient RTC in 10/2023 before his surgery to recheck his labs to be sure okay to proceed with surgery.  Plan to check platelet antibodies as well at that time.    He has been cleared by cardiologist to get off the Plavix and Eliquis for surgery.    Patient had prior GI work-up with unremarkable EGD/colonoscopy. Referral was sent back to Dr. Estrada for capsule study.  May can also consider getting off Protonix since this can cause malabsorption.     All questions answered at this time.       Anne Marie MD

## 2023-06-07 ENCOUNTER — OFFICE VISIT (OUTPATIENT)
Dept: HEMATOLOGY/ONCOLOGY | Facility: CLINIC | Age: 63
End: 2023-06-07
Payer: MEDICARE

## 2023-06-07 VITALS
RESPIRATION RATE: 14 BRPM | HEART RATE: 66 BPM | TEMPERATURE: 98 F | DIASTOLIC BLOOD PRESSURE: 73 MMHG | BODY MASS INDEX: 28.09 KG/M2 | HEIGHT: 67 IN | OXYGEN SATURATION: 96 % | SYSTOLIC BLOOD PRESSURE: 134 MMHG | WEIGHT: 179 LBS

## 2023-06-07 DIAGNOSIS — D69.6 THROMBOCYTOPENIA: ICD-10-CM

## 2023-06-07 DIAGNOSIS — D50.0 IRON DEFICIENCY ANEMIA DUE TO CHRONIC BLOOD LOSS: Primary | ICD-10-CM

## 2023-06-07 DIAGNOSIS — D51.3 OTHER DIETARY VITAMIN B12 DEFICIENCY ANEMIA: ICD-10-CM

## 2023-06-07 PROCEDURE — 99999 PR PBB SHADOW E&M-EST. PATIENT-LVL IV: CPT | Mod: PBBFAC,,, | Performed by: INTERNAL MEDICINE

## 2023-06-07 PROCEDURE — 99214 OFFICE O/P EST MOD 30 MIN: CPT | Mod: S$GLB,,, | Performed by: INTERNAL MEDICINE

## 2023-06-07 PROCEDURE — 3075F PR MOST RECENT SYSTOLIC BLOOD PRESS GE 130-139MM HG: ICD-10-PCS | Mod: CPTII,S$GLB,, | Performed by: INTERNAL MEDICINE

## 2023-06-07 PROCEDURE — 3008F PR BODY MASS INDEX (BMI) DOCUMENTED: ICD-10-PCS | Mod: CPTII,S$GLB,, | Performed by: INTERNAL MEDICINE

## 2023-06-07 PROCEDURE — 3075F SYST BP GE 130 - 139MM HG: CPT | Mod: CPTII,S$GLB,, | Performed by: INTERNAL MEDICINE

## 2023-06-07 PROCEDURE — 1160F RVW MEDS BY RX/DR IN RCRD: CPT | Mod: CPTII,S$GLB,, | Performed by: INTERNAL MEDICINE

## 2023-06-07 PROCEDURE — 99214 PR OFFICE/OUTPT VISIT, EST, LEVL IV, 30-39 MIN: ICD-10-PCS | Mod: S$GLB,,, | Performed by: INTERNAL MEDICINE

## 2023-06-07 PROCEDURE — 1159F PR MEDICATION LIST DOCUMENTED IN MEDICAL RECORD: ICD-10-PCS | Mod: CPTII,S$GLB,, | Performed by: INTERNAL MEDICINE

## 2023-06-07 PROCEDURE — 1160F PR REVIEW ALL MEDS BY PRESCRIBER/CLIN PHARMACIST DOCUMENTED: ICD-10-PCS | Mod: CPTII,S$GLB,, | Performed by: INTERNAL MEDICINE

## 2023-06-07 PROCEDURE — 3078F PR MOST RECENT DIASTOLIC BLOOD PRESSURE < 80 MM HG: ICD-10-PCS | Mod: CPTII,S$GLB,, | Performed by: INTERNAL MEDICINE

## 2023-06-07 PROCEDURE — 99999 PR PBB SHADOW E&M-EST. PATIENT-LVL IV: ICD-10-PCS | Mod: PBBFAC,,, | Performed by: INTERNAL MEDICINE

## 2023-06-07 PROCEDURE — 3008F BODY MASS INDEX DOCD: CPT | Mod: CPTII,S$GLB,, | Performed by: INTERNAL MEDICINE

## 2023-06-07 PROCEDURE — 3078F DIAST BP <80 MM HG: CPT | Mod: CPTII,S$GLB,, | Performed by: INTERNAL MEDICINE

## 2023-06-07 PROCEDURE — 1159F MED LIST DOCD IN RCRD: CPT | Mod: CPTII,S$GLB,, | Performed by: INTERNAL MEDICINE

## 2023-08-17 ENCOUNTER — TELEPHONE (OUTPATIENT)
Dept: ORTHOPEDICS | Facility: CLINIC | Age: 63
End: 2023-08-17
Payer: MEDICARE

## 2023-08-23 ENCOUNTER — OFFICE VISIT (OUTPATIENT)
Dept: ORTHOPEDICS | Facility: CLINIC | Age: 63
End: 2023-08-23
Payer: MEDICARE

## 2023-08-23 VITALS
BODY MASS INDEX: 26.68 KG/M2 | SYSTOLIC BLOOD PRESSURE: 131 MMHG | HEART RATE: 53 BPM | HEIGHT: 67 IN | DIASTOLIC BLOOD PRESSURE: 71 MMHG | WEIGHT: 170 LBS

## 2023-08-23 DIAGNOSIS — M17.0 PRIMARY OSTEOARTHRITIS OF BOTH KNEES: Primary | ICD-10-CM

## 2023-08-23 PROCEDURE — 20610 DRAIN/INJ JOINT/BURSA W/O US: CPT | Mod: 50,,, | Performed by: PHYSICIAN ASSISTANT

## 2023-08-23 PROCEDURE — 3044F PR MOST RECENT HEMOGLOBIN A1C LEVEL <7.0%: ICD-10-PCS | Mod: CPTII,,, | Performed by: PHYSICIAN ASSISTANT

## 2023-08-23 PROCEDURE — 99214 PR OFFICE/OUTPT VISIT, EST, LEVL IV, 30-39 MIN: ICD-10-PCS | Mod: 25,,, | Performed by: PHYSICIAN ASSISTANT

## 2023-08-23 PROCEDURE — 1159F PR MEDICATION LIST DOCUMENTED IN MEDICAL RECORD: ICD-10-PCS | Mod: CPTII,,, | Performed by: PHYSICIAN ASSISTANT

## 2023-08-23 PROCEDURE — 3075F SYST BP GE 130 - 139MM HG: CPT | Mod: CPTII,,, | Performed by: PHYSICIAN ASSISTANT

## 2023-08-23 PROCEDURE — 3044F HG A1C LEVEL LT 7.0%: CPT | Mod: CPTII,,, | Performed by: PHYSICIAN ASSISTANT

## 2023-08-23 PROCEDURE — 1160F RVW MEDS BY RX/DR IN RCRD: CPT | Mod: CPTII,,, | Performed by: PHYSICIAN ASSISTANT

## 2023-08-23 PROCEDURE — 3078F DIAST BP <80 MM HG: CPT | Mod: CPTII,,, | Performed by: PHYSICIAN ASSISTANT

## 2023-08-23 PROCEDURE — 3008F BODY MASS INDEX DOCD: CPT | Mod: CPTII,,, | Performed by: PHYSICIAN ASSISTANT

## 2023-08-23 PROCEDURE — 3078F PR MOST RECENT DIASTOLIC BLOOD PRESSURE < 80 MM HG: ICD-10-PCS | Mod: CPTII,,, | Performed by: PHYSICIAN ASSISTANT

## 2023-08-23 PROCEDURE — 3008F PR BODY MASS INDEX (BMI) DOCUMENTED: ICD-10-PCS | Mod: CPTII,,, | Performed by: PHYSICIAN ASSISTANT

## 2023-08-23 PROCEDURE — 1160F PR REVIEW ALL MEDS BY PRESCRIBER/CLIN PHARMACIST DOCUMENTED: ICD-10-PCS | Mod: CPTII,,, | Performed by: PHYSICIAN ASSISTANT

## 2023-08-23 PROCEDURE — 3075F PR MOST RECENT SYSTOLIC BLOOD PRESS GE 130-139MM HG: ICD-10-PCS | Mod: CPTII,,, | Performed by: PHYSICIAN ASSISTANT

## 2023-08-23 PROCEDURE — 1159F MED LIST DOCD IN RCRD: CPT | Mod: CPTII,,, | Performed by: PHYSICIAN ASSISTANT

## 2023-08-23 PROCEDURE — 99214 OFFICE O/P EST MOD 30 MIN: CPT | Mod: 25,,, | Performed by: PHYSICIAN ASSISTANT

## 2023-08-23 PROCEDURE — 20610 LARGE JOINT ASPIRATION/INJECTION: BILATERAL KNEE: ICD-10-PCS | Mod: 50,,, | Performed by: PHYSICIAN ASSISTANT

## 2023-08-23 RX ORDER — LIDOCAINE HYDROCHLORIDE 20 MG/ML
3 INJECTION, SOLUTION INFILTRATION; PERINEURAL
Status: DISCONTINUED | OUTPATIENT
Start: 2023-08-23 | End: 2023-08-23 | Stop reason: HOSPADM

## 2023-08-23 RX ORDER — BETAMETHASONE SODIUM PHOSPHATE AND BETAMETHASONE ACETATE 3; 3 MG/ML; MG/ML
12 INJECTION, SUSPENSION INTRA-ARTICULAR; INTRALESIONAL; INTRAMUSCULAR; SOFT TISSUE
Status: DISCONTINUED | OUTPATIENT
Start: 2023-08-23 | End: 2023-08-23 | Stop reason: HOSPADM

## 2023-08-23 RX ORDER — LANOLIN ALCOHOL/MO/W.PET/CERES
1 CREAM (GRAM) TOPICAL EVERY MORNING
COMMUNITY

## 2023-08-23 RX ORDER — IBUPROFEN 100 MG/5ML
1 SUSPENSION, ORAL (FINAL DOSE FORM) ORAL EVERY MORNING
COMMUNITY

## 2023-08-23 RX ADMIN — BETAMETHASONE SODIUM PHOSPHATE AND BETAMETHASONE ACETATE 12 MG: 3; 3 INJECTION, SUSPENSION INTRA-ARTICULAR; INTRALESIONAL; INTRAMUSCULAR; SOFT TISSUE at 01:08

## 2023-08-23 RX ADMIN — LIDOCAINE HYDROCHLORIDE 3 MG: 20 INJECTION, SOLUTION INFILTRATION; PERINEURAL at 01:08

## 2023-08-23 NOTE — PROGRESS NOTES
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    azelastine (ASTELIN) 137 mcg (0.1 %) nasal spray 1 spray by Nasal route.    azelastine (ASTELIN) 137 mcg (0.1 %) nasal spray Astelin 137 mcg (0.1 %) nasal spray aerosol   Spray 2 sprays twice a day by intranasal route as directed for 30 days.    clopidogreL (PLAVIX) 75 mg tablet Take 75 mg by mouth once daily.    cyanocobalamin (VITAMIN B-12) 1000 MCG tablet Take 1 tablet by mouth every morning.    doxycycline (VIBRAMYCIN) 50 mg/5 mL Syrp Take 50 mg by mouth.    ELIQUIS 5 mg Tab Take 5 mg by mouth 2 (two) times daily.    famotidine (PEPCID) 40 MG tablet SMARTSI Tablet(s) By Mouth Every Evening    FEROSUL 325 mg (65 mg iron) Tab tablet Take by mouth every morning.    fluticasone propionate (FLONASE) 50 mcg/actuation nasal spray 1 spray by Nasal route.    ipratropium (ATROVENT) 42 mcg (0.06 %) nasal spray 2 sprays 2 (two) times daily.    levETIRAcetam (KEPPRA) 500 MG Tab Take 1 tablet (500 mg total) by mouth 2 (two) times daily. (Patient taking differently: Take 500 mg by mouth 2 (two) times daily. 1/2 tab BID)    metoprolol tartrate (LOPRESSOR) 50 MG tablet Take 50 mg by mouth 2 (two) times daily.    pantoprazole (PROTONIX) 40 MG tablet Take 40 mg by mouth every morning.    rosuvastatin (CRESTOR) 20 MG tablet Take 20 mg by mouth every evening.    tadalafiL (CIALIS) 5 MG tablet Take 5 mg by mouth once daily.    tamsulosin (FLOMAX) 0.4 mg Cap Take 1 capsule by mouth every evening.    traZODone (DESYREL) 50 MG tablet Take 50 mg by mouth every evening.     No current facility-administered medications for this visit.       Review of patient's allergies indicates:   Allergen Reactions     Strawberry Anaphylaxis, Itching and Other (See Comments)     Other reaction(s): Hives, Throat closes       Family History   Family history unknown: Yes       Social History     Socioeconomic History    Marital status:    Tobacco Use    Smoking status: Every Day     Current packs/day: 1.00     Types: Cigarettes    Smokeless tobacco: Never   Substance and Sexual Activity    Alcohol use: Yes     Alcohol/week: 5.0 standard drinks of alcohol     Types: 5 Cans of beer per week     Comment: Daily    Drug use: Never    Sexual activity: Yes     Partners: Female       Chief Complaint:   Chief Complaint   Patient presents with    Left Knee - Pain, Injections    Right Knee - Pain, Injections    Injections     Pt is present for B/L Cortisone injections       Consulting Physician: No ref. provider found    History of present illness:    This is a 62 y.o. year old male who presents today for cortisone injections to the right and left knee.  Patient has advanced osteoarthritis of both knees.  He is gone in a one-month long trip out of the country and tentatively scheduled for robotic assisted right total knee arthroplasty in late October.  He also has peripheral arterial disease.  He has had multiple stents and iliac grafts as well as femoral popliteal bypass grafts.  This has been performed in both lower extremities.  He has good perfusion.  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  He ambulates with a cane and walker.    Review of Systems:    Constitution:   Denies chills, fever, and sweats.  HENT:   Denies headaches or blurry vision.  Cardiovascular:  Denies chest pain or irregular heart beat.  Respiratory:   Denies cough or shortness of breath.  Gastrointestinal:  Denies abdominal pain, nausea, or vomiting.  Musculoskeletal:   Denies muscle cramps.  Neurological:   Denies dizziness or focal weakness.  Psychiatric/Behavior: Normal mental status.  Hematology/Lymph:  " Denies bleeding problem or easy bruising/bleeding.  Skin:    Denies rash or suspicious lesions.    Examination:    Vital Signs:    Vitals:    23 1254   BP: 131/71   Pulse: (!) 53   Weight: 77.1 kg (170 lb)   Height: 5' 7" (1.702 m)       Body mass index is 26.63 kg/m².    Constitution:   Well-developed, well nourished patient in no acute distress.  Neurological:   Alert and oriented x 3 and cooperative to examination.     Psychiatric/Behavior: Normal mental status.  Respiratory:   No shortness of breath.  Eyes:    Extraoccular muscles intact  Skin:    No scars, rash or suspicious lesions.    Physical Exam:     General Musculoskeletal Exam   Gait: antalgic       Right Knee Exam     Inspection   Erythema: absent  Effusion: present  Deformity: present  Bruising: absent    Tenderness   The patient is tender to palpation of the condyle, iliotibial band, lateral joint line and lateral retinaculum.    Crepitus   The patient has crepitus of the lateral joint line.    Range of Motion   Extension: abnormal   Flexion: abnormal   10 degrees to 120 degrees    Tests   Meniscus   Porter:  Medial - negative Lateral - positive  Ligament Examination   MCL - Valgus: normal (0 to 2mm)  LCL - Varus: normal  Patella   Passive Patellar Tilt: neutral    Other   Sensation: normal    Comments:  Valgus deformity    Muscle Strength   Right Lower Extremity   Quadriceps:  5/5   Hamstrin/5     Vascular Exam     Right Pulses  Dorsalis Pedis:      2+  Posterior Tibial:      2+          General Musculoskeletal Exam   Gait: antalgic         Left Knee Exam     Inspection   Erythema: absent  Effusion: present  Deformity: present  Bruising: absent    Tenderness   The patient tender to palpation of the lateral retinaculum, lateral joint line, condyle and iliotibial band.    Crepitus   The patient has crepitus of the lateral joint line.    Range of Motion   Extension: abnormal   Flexion: abnormal   10 degrees to 120 degrees    Tests "   Meniscus   Porter:  Medial - negative Lateral - negative  Stability   MCL - Valgus: normal (0 to 2mm)  LCL - Varus: normal (0 to 2mm)  Patella   Passive Patellar Tilt: neutral    Other   Sensation: normal    Comments:  Valgus deformity    Muscle Strength   Left Lower Extremity   Quadriceps:  5/5   Hamstrin/5     Vascular Exam       Left Pulses  Dorsalis Pedis:      2+  Posterior Tibial:      2+            Assessment: Primary osteoarthritis of both knees        Plan:    Corticosteroid injection to both knees today.  He is scheduled for right total knee arthroplasty in late October.          DISCLAIMER: This note may have been dictated using voice recognition software and may contain grammatical errors.     NOTE: Consult report sent to referring provider via [facility].

## 2023-08-23 NOTE — PROCEDURES
Large Joint Aspiration/Injection: bilateral knee    Date/Time: 8/23/2023 1:00 PM    Performed by: Kaden Swain PA  Authorized by: Kaden Swain PA    Consent Done?:  Yes (Verbal)  Indications:  Pain  Site marked: the procedure site was marked    Prep: patient was prepped and draped in usual sterile fashion    Approach:  Anterolateral  Location:  Knee  Laterality:  Bilateral  Site:  Bilateral knee  Medications (Right):  12 mg betamethasone acetate-betamethasone sodium phosphate 6 mg/mL; 3 mg LIDOcaine HCL 20 mg/ml (2%) 20 mg/mL (2 %)  Medications (Left):  12 mg betamethasone acetate-betamethasone sodium phosphate 6 mg/mL; 3 mg LIDOcaine HCL 20 mg/ml (2%) 20 mg/mL (2 %)  Patient tolerance:  Patient tolerated the procedure well with no immediate complications

## 2023-10-11 ENCOUNTER — OFFICE VISIT (OUTPATIENT)
Dept: ORTHOPEDICS | Facility: CLINIC | Age: 63
End: 2023-10-11
Payer: MEDICARE

## 2023-10-11 ENCOUNTER — HOSPITAL ENCOUNTER (OUTPATIENT)
Dept: RADIOLOGY | Facility: HOSPITAL | Age: 63
Discharge: HOME | End: 2023-10-11
Attending: PHYSICIAN ASSISTANT
Payer: MEDICARE

## 2023-10-11 ENCOUNTER — TELEPHONE (OUTPATIENT)
Dept: PREADMISSION TESTING | Facility: HOSPITAL | Age: 63
End: 2023-10-11
Payer: MEDICARE

## 2023-10-11 VITALS
DIASTOLIC BLOOD PRESSURE: 76 MMHG | WEIGHT: 170.19 LBS | HEIGHT: 67 IN | BODY MASS INDEX: 26.71 KG/M2 | HEART RATE: 51 BPM | SYSTOLIC BLOOD PRESSURE: 127 MMHG

## 2023-10-11 DIAGNOSIS — R26.89 IMPAIRED GAIT AND MOBILITY: ICD-10-CM

## 2023-10-11 DIAGNOSIS — M17.11 PRIMARY OSTEOARTHRITIS OF RIGHT KNEE: Primary | ICD-10-CM

## 2023-10-11 DIAGNOSIS — Z01.812 PRE-OPERATIVE LABORATORY EXAMINATION: ICD-10-CM

## 2023-10-11 DIAGNOSIS — M17.11 PRIMARY OSTEOARTHRITIS OF RIGHT KNEE: ICD-10-CM

## 2023-10-11 DIAGNOSIS — R79.9 ABNORMAL BLOOD CHEMISTRY: ICD-10-CM

## 2023-10-11 DIAGNOSIS — Z95.828 HISTORY OF EXTREMITY BYPASS GRAFT: ICD-10-CM

## 2023-10-11 DIAGNOSIS — I73.9 PERIPHERAL ARTERIAL DISEASE: ICD-10-CM

## 2023-10-11 DIAGNOSIS — I10 HYPERTENSION, UNSPECIFIED TYPE: ICD-10-CM

## 2023-10-11 LAB — MRSA PCR SCRN (OHS): NOT DETECTED

## 2023-10-11 PROCEDURE — 3008F PR BODY MASS INDEX (BMI) DOCUMENTED: ICD-10-PCS | Mod: CPTII,,, | Performed by: SPECIALIST

## 2023-10-11 PROCEDURE — 73700 CT LOWER EXTREMITY W/O DYE: CPT | Mod: TC,RT

## 2023-10-11 PROCEDURE — 3008F BODY MASS INDEX DOCD: CPT | Mod: CPTII,,, | Performed by: SPECIALIST

## 2023-10-11 PROCEDURE — 1160F PR REVIEW ALL MEDS BY PRESCRIBER/CLIN PHARMACIST DOCUMENTED: ICD-10-PCS | Mod: CPTII,,, | Performed by: SPECIALIST

## 2023-10-11 PROCEDURE — 3044F PR MOST RECENT HEMOGLOBIN A1C LEVEL <7.0%: ICD-10-PCS | Mod: CPTII,,, | Performed by: SPECIALIST

## 2023-10-11 PROCEDURE — 1160F RVW MEDS BY RX/DR IN RCRD: CPT | Mod: CPTII,,, | Performed by: SPECIALIST

## 2023-10-11 PROCEDURE — 1159F MED LIST DOCD IN RCRD: CPT | Mod: CPTII,,, | Performed by: SPECIALIST

## 2023-10-11 PROCEDURE — 3074F PR MOST RECENT SYSTOLIC BLOOD PRESSURE < 130 MM HG: ICD-10-PCS | Mod: CPTII,,, | Performed by: SPECIALIST

## 2023-10-11 PROCEDURE — 71046 X-RAY EXAM CHEST 2 VIEWS: CPT | Mod: TC

## 2023-10-11 PROCEDURE — 1159F PR MEDICATION LIST DOCUMENTED IN MEDICAL RECORD: ICD-10-PCS | Mod: CPTII,,, | Performed by: SPECIALIST

## 2023-10-11 PROCEDURE — 99214 OFFICE O/P EST MOD 30 MIN: CPT | Mod: ,,, | Performed by: SPECIALIST

## 2023-10-11 PROCEDURE — 3044F HG A1C LEVEL LT 7.0%: CPT | Mod: CPTII,,, | Performed by: SPECIALIST

## 2023-10-11 PROCEDURE — 3074F SYST BP LT 130 MM HG: CPT | Mod: CPTII,,, | Performed by: SPECIALIST

## 2023-10-11 PROCEDURE — 3078F PR MOST RECENT DIASTOLIC BLOOD PRESSURE < 80 MM HG: ICD-10-PCS | Mod: CPTII,,, | Performed by: SPECIALIST

## 2023-10-11 PROCEDURE — 99214 PR OFFICE/OUTPT VISIT, EST, LEVL IV, 30-39 MIN: ICD-10-PCS | Mod: ,,, | Performed by: SPECIALIST

## 2023-10-11 PROCEDURE — 3078F DIAST BP <80 MM HG: CPT | Mod: CPTII,,, | Performed by: SPECIALIST

## 2023-10-11 RX ORDER — ACETAMINOPHEN 500 MG
1000 TABLET ORAL
Status: CANCELLED | OUTPATIENT
Start: 2023-10-11

## 2023-10-11 RX ORDER — SODIUM CHLORIDE, SODIUM GLUCONATE, SODIUM ACETATE, POTASSIUM CHLORIDE AND MAGNESIUM CHLORIDE 30; 37; 368; 526; 502 MG/100ML; MG/100ML; MG/100ML; MG/100ML; MG/100ML
INJECTION, SOLUTION INTRAVENOUS CONTINUOUS
Status: CANCELLED | OUTPATIENT
Start: 2023-10-11

## 2023-10-11 RX ORDER — SCOLOPAMINE TRANSDERMAL SYSTEM 1 MG/1
1 PATCH, EXTENDED RELEASE TRANSDERMAL ONCE AS NEEDED
Status: CANCELLED | OUTPATIENT
Start: 2023-10-11 | End: 2035-03-08

## 2023-10-11 RX ORDER — GABAPENTIN 100 MG/1
300 CAPSULE ORAL
Status: CANCELLED | OUTPATIENT
Start: 2023-10-11

## 2023-10-11 RX ORDER — ONDANSETRON 4 MG/1
4 TABLET, ORALLY DISINTEGRATING ORAL
Status: CANCELLED | OUTPATIENT
Start: 2023-10-11

## 2023-10-11 RX ORDER — TRANEXAMIC ACID 650 MG/1
1950 TABLET ORAL
Status: CANCELLED | OUTPATIENT
Start: 2023-10-11 | End: 2023-10-11

## 2023-10-11 RX ORDER — KETOROLAC TROMETHAMINE 10 MG/1
10 TABLET, FILM COATED ORAL
Status: CANCELLED | OUTPATIENT
Start: 2023-10-11 | End: 2023-10-11

## 2023-10-11 NOTE — TELEPHONE ENCOUNTER
I see the CRA and the information on when to hold the eliquis.  I do not see whenre Dr. Phipps addressed the Plavix.  Can you please contact him and request time frame to stopping Plavix? Thanks

## 2023-10-11 NOTE — H&P (VIEW-ONLY)
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    azelastine (ASTELIN) 137 mcg (0.1 %) nasal spray 1 spray by Nasal route.    azelastine (ASTELIN) 137 mcg (0.1 %) nasal spray Astelin 137 mcg (0.1 %) nasal spray aerosol   Spray 2 sprays twice a day by intranasal route as directed for 30 days.    clopidogreL (PLAVIX) 75 mg tablet Take 75 mg by mouth once daily.    cyanocobalamin (VITAMIN B-12) 1000 MCG tablet Take 1 tablet by mouth every morning.    doxycycline (VIBRAMYCIN) 50 mg/5 mL Syrp Take 50 mg by mouth.    ELIQUIS 5 mg Tab Take 5 mg by mouth 2 (two) times daily.    famotidine (PEPCID) 40 MG tablet SMARTSI Tablet(s) By Mouth Every Evening    FEROSUL 325 mg (65 mg iron) Tab tablet Take by mouth every morning.    fluticasone propionate (FLONASE) 50 mcg/actuation nasal spray 1 spray by Nasal route.    ipratropium (ATROVENT) 42 mcg (0.06 %) nasal spray 2 sprays 2 (two) times daily.    metoprolol tartrate (LOPRESSOR) 50 MG tablet Take 50 mg by mouth 2 (two) times daily.    pantoprazole (PROTONIX) 40 MG tablet Take 40 mg by mouth every morning.    rosuvastatin (CRESTOR) 20 MG tablet Take 20 mg by mouth every evening.    tadalafiL (CIALIS) 5 MG tablet Take 5 mg by mouth once daily.    tamsulosin (FLOMAX) 0.4 mg Cap Take 1 capsule by mouth every evening.    traZODone (DESYREL) 50 MG tablet Take 50 mg by mouth every evening.    levETIRAcetam (KEPPRA) 500 MG Tab Take 1 tablet (500 mg total) by mouth 2 (two) times daily. (Patient taking differently: Take 500 mg by mouth 2 (two) times daily. 1/2 tab BID)     No current facility-administered medications for this visit.       Review of patient's allergies indicates:   Allergen Reactions     "Strawberry Anaphylaxis, Itching and Other (See Comments)     Other reaction(s): Hives, Throat closes       Family History   Family history unknown: Yes       Social History     Socioeconomic History    Marital status:    Tobacco Use    Smoking status: Every Day     Current packs/day: 1.00     Types: Cigarettes    Smokeless tobacco: Never   Substance and Sexual Activity    Alcohol use: Yes     Alcohol/week: 5.0 standard drinks of alcohol     Types: 5 Cans of beer per week     Comment: Daily    Drug use: Never    Sexual activity: Yes     Partners: Female       Chief Complaint:   Chief Complaint   Patient presents with    Right Knee - Pre-op Exam     Pre-op for RT TKA [facility] 10/31/23       Consulting Physician: No ref. provider found    History of present illness:    This is a 62 y.o. year old male who complains of pain in his right knee secondary to osteoarthrosis.  He also has had multiple vascular bypass grafts secondary to peripheral arterial disease.  He has good pulses.  His anemia has been improved and he is scheduled for a robotic assisted right total knee arthroplasty on October 31st.  We will not use a tourniquet.    Review of Systems:    Constitution:   Denies chills, fever, and sweats.  HENT:   Denies headaches or blurry vision.  Cardiovascular:  Denies chest pain or irregular heart beat.  Respiratory:   Denies cough or shortness of breath.  Gastrointestinal:  Denies abdominal pain, nausea, or vomiting.  Musculoskeletal:   Denies muscle cramps.  Neurological:   Denies dizziness or focal weakness.  Psychiatric/Behavior: Normal mental status.  Hematology/Lymph:  Denies bleeding problem or easy bruising/bleeding.  Skin:    Denies rash or suspicious lesions.    Examination:    Vital Signs:    Vitals:    10/11/23 0824   BP: 127/76   Pulse: (!) 51   Weight: 77.2 kg (170 lb 3.2 oz)   Height: 5' 7" (1.702 m)       Body mass index is 26.66 kg/m².    Constitution:   Well-developed, well nourished patient in no " acute distress.  Neurological:   Alert and oriented x 3 and cooperative to examination.     Psychiatric/Behavior: Normal mental status.  Respiratory:   No shortness of breath.non labored breathing.  Cardiovascular: Regular rate and rhythm  Eyes:    Extraoccular muscles intact  Skin:    No scars, rash or suspicious lesions.    Physical Exam: Physical exam  General exam:  Well groomed, well nourished, no acute distress  Alert and oriented x3  HEENT:  Pupils are equal, round, and reactive to light, normocephalic, atraumatic  Cardiovascular:  S1 and S2 heard, no murmurs  Pulmonary:  Lungs clear to auscultation bilaterally  Gastrointestinal:  Positive bowel sounds, soft, nontender, nondistended        General Musculoskeletal Exam   Gait: antalgic       Right Knee Exam     Inspection   Erythema: absent  Effusion: present  Deformity: present  Bruising: absent    Tenderness   The patient is tender to palpation of the condyle, iliotibial band, lateral joint line and lateral retinaculum.    Crepitus   The patient has crepitus of the lateral joint line.    Range of Motion   Extension: abnormal   Flexion: abnormal   0° to 125°    Tests   Meniscus   Porter:  Medial - negative Lateral - positive  Ligament Examination   MCL - Valgus: normal (0 to 2mm)  LCL - Varus: normal  Patella   Passive Patellar Tilt: neutral    Other   Sensation: normal    Comments:  Valgus deformity    Muscle Strength   Right Lower Extremity   Quadriceps:  5/5   Hamstrin/5     Vascular Exam     Right Pulses  Dorsalis Pedis:      2+  Posterior Tibial:      2+          Imaging: X-rays reviewed and images interpreted today personally by me of four views of the right knee which show vascular grafts in place as well as advanced osteoarthrosis and grade 4 changes and sclerosis in the lateral compartment with associated valgus deformity and osteophyte formation.  Impression: Advanced osteoarthrosis right knee        Assessment: Primary osteoarthritis of  right knee    Peripheral arterial disease    History of extremity bypass graft    Hypertension, unspecified type    Impaired gait and mobility    Pre-operative laboratory examination        Plan:  Robotic assisted right total knee arthroplasty without a tourniquet on October 31st    Risks, benefits, alternatives, and complications were explained to the patient and/or patient representative. They understand, agree, and want to proceed with the operation/ procedure.        DISCLAIMER: This note may have been dictated using voice recognition software and may contain grammatical errors.     NOTE: Consult report sent to referring provider via [employer] EMR.

## 2023-10-11 NOTE — H&P
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    azelastine (ASTELIN) 137 mcg (0.1 %) nasal spray 1 spray by Nasal route.    azelastine (ASTELIN) 137 mcg (0.1 %) nasal spray Astelin 137 mcg (0.1 %) nasal spray aerosol   Spray 2 sprays twice a day by intranasal route as directed for 30 days.    clopidogreL (PLAVIX) 75 mg tablet Take 75 mg by mouth once daily.    cyanocobalamin (VITAMIN B-12) 1000 MCG tablet Take 1 tablet by mouth every morning.    doxycycline (VIBRAMYCIN) 50 mg/5 mL Syrp Take 50 mg by mouth.    ELIQUIS 5 mg Tab Take 5 mg by mouth 2 (two) times daily.    famotidine (PEPCID) 40 MG tablet SMARTSI Tablet(s) By Mouth Every Evening    FEROSUL 325 mg (65 mg iron) Tab tablet Take by mouth every morning.    fluticasone propionate (FLONASE) 50 mcg/actuation nasal spray 1 spray by Nasal route.    ipratropium (ATROVENT) 42 mcg (0.06 %) nasal spray 2 sprays 2 (two) times daily.    metoprolol tartrate (LOPRESSOR) 50 MG tablet Take 50 mg by mouth 2 (two) times daily.    pantoprazole (PROTONIX) 40 MG tablet Take 40 mg by mouth every morning.    rosuvastatin (CRESTOR) 20 MG tablet Take 20 mg by mouth every evening.    tadalafiL (CIALIS) 5 MG tablet Take 5 mg by mouth once daily.    tamsulosin (FLOMAX) 0.4 mg Cap Take 1 capsule by mouth every evening.    traZODone (DESYREL) 50 MG tablet Take 50 mg by mouth every evening.    levETIRAcetam (KEPPRA) 500 MG Tab Take 1 tablet (500 mg total) by mouth 2 (two) times daily. (Patient taking differently: Take 500 mg by mouth 2 (two) times daily. 1/2 tab BID)     No current facility-administered medications for this visit.       Review of patient's allergies indicates:   Allergen Reactions     "Strawberry Anaphylaxis, Itching and Other (See Comments)     Other reaction(s): Hives, Throat closes       Family History   Family history unknown: Yes       Social History     Socioeconomic History    Marital status:    Tobacco Use    Smoking status: Every Day     Current packs/day: 1.00     Types: Cigarettes    Smokeless tobacco: Never   Substance and Sexual Activity    Alcohol use: Yes     Alcohol/week: 5.0 standard drinks of alcohol     Types: 5 Cans of beer per week     Comment: Daily    Drug use: Never    Sexual activity: Yes     Partners: Female       Chief Complaint:   Chief Complaint   Patient presents with    Right Knee - Pre-op Exam     Pre-op for RT T[facility] 10/31/23       Consulting Physician: No ref. provider found    History of present illness:    This is a 62 y.o. year old male who complains of pain in his right knee secondary to osteoarthrosis.  He also has had multiple vascular bypass grafts secondary to peripheral arterial disease.  He has good pulses.  His anemia has been improved and he is scheduled for a robotic assisted right total knee arthroplasty on October 31st.  We will not use a tourniquet.    Review of Systems:    Constitution:   Denies chills, fever, and sweats.  HENT:   Denies headaches or blurry vision.  Cardiovascular:  Denies chest pain or irregular heart beat.  Respiratory:   Denies cough or shortness of breath.  Gastrointestinal:  Denies abdominal pain, nausea, or vomiting.  Musculoskeletal:   Denies muscle cramps.  Neurological:   Denies dizziness or focal weakness.  Psychiatric/Behavior: Normal mental status.  Hematology/Lymph:  Denies bleeding problem or easy bruising/bleeding.  Skin:    Denies rash or suspicious lesions.    Examination:    Vital Signs:    Vitals:    10/11/23 0824   BP: 127/76   Pulse: (!) 51   Weight: 77.2 kg (170 lb 3.2 oz)   Height: 5' 7" (1.702 m)       Body mass index is 26.66 kg/m².    Constitution:   Well-developed, well nourished patient in no " acute distress.  Neurological:   Alert and oriented x 3 and cooperative to examination.     Psychiatric/Behavior: Normal mental status.  Respiratory:   No shortness of breath.non labored breathing.  Cardiovascular: Regular rate and rhythm  Eyes:    Extraoccular muscles intact  Skin:    No scars, rash or suspicious lesions.    Physical Exam: Physical exam  General exam:  Well groomed, well nourished, no acute distress  Alert and oriented x3  HEENT:  Pupils are equal, round, and reactive to light, normocephalic, atraumatic  Cardiovascular:  S1 and S2 heard, no murmurs  Pulmonary:  Lungs clear to auscultation bilaterally  Gastrointestinal:  Positive bowel sounds, soft, nontender, nondistended        General Musculoskeletal Exam   Gait: antalgic       Right Knee Exam     Inspection   Erythema: absent  Effusion: present  Deformity: present  Bruising: absent    Tenderness   The patient is tender to palpation of the condyle, iliotibial band, lateral joint line and lateral retinaculum.    Crepitus   The patient has crepitus of the lateral joint line.    Range of Motion   Extension: abnormal   Flexion: abnormal   0° to 125°    Tests   Meniscus   Porter:  Medial - negative Lateral - positive  Ligament Examination   MCL - Valgus: normal (0 to 2mm)  LCL - Varus: normal  Patella   Passive Patellar Tilt: neutral    Other   Sensation: normal    Comments:  Valgus deformity    Muscle Strength   Right Lower Extremity   Quadriceps:  5/5   Hamstrin/5     Vascular Exam     Right Pulses  Dorsalis Pedis:      2+  Posterior Tibial:      2+          Imaging: X-rays reviewed and images interpreted today personally by me of four views of the right knee which show vascular grafts in place as well as advanced osteoarthrosis and grade 4 changes and sclerosis in the lateral compartment with associated valgus deformity and osteophyte formation.  Impression: Advanced osteoarthrosis right knee        Assessment: Primary osteoarthritis of  right knee    Peripheral arterial disease    History of extremity bypass graft    Hypertension, unspecified type    Impaired gait and mobility    Pre-operative laboratory examination        Plan:  Robotic assisted right total knee arthroplasty without a tourniquet on October 31st    Risks, benefits, alternatives, and complications were explained to the patient and/or patient representative. They understand, agree, and want to proceed with the operation/ procedure.        DISCLAIMER: This note may have been dictated using voice recognition software and may contain grammatical errors.     NOTE: Consult report sent to referring provider via [employer] EMR.

## 2023-10-24 ENCOUNTER — TELEPHONE (OUTPATIENT)
Dept: HEMATOLOGY/ONCOLOGY | Facility: CLINIC | Age: 63
End: 2023-10-24
Payer: MEDICARE

## 2023-10-24 NOTE — TELEPHONE ENCOUNTER
Pt's girlfriend, (Maris Quinn [phone]) called and states that pt recently has labs done for sx next week. Please cancel these appts. Maris will call to r/s appts for Ean on the same day as she returns.

## 2023-10-25 ENCOUNTER — ANESTHESIA EVENT (OUTPATIENT)
Dept: SURGERY | Facility: HOSPITAL | Age: 63
DRG: 470 | End: 2023-10-25
Payer: MEDICARE

## 2023-10-30 DIAGNOSIS — Z96.651 S/P TOTAL KNEE REPLACEMENT, RIGHT: Primary | ICD-10-CM

## 2023-10-30 NOTE — ANESTHESIA PREPROCEDURE EVALUATION
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  Has had both LE's revascularized and surgical plan was for no tourniquet.  Pt off Plavix X 1 week and off eliquis X 2 days    Pre-op Assessment    I have reviewed the NPO Status.      Review of Systems  Social:  Smoker    Cardiovascular:   Exercise tolerance: poor Hypertension CAD    Peripheral Arterial Disease Denies Claudication Has had bilateral fem pop bypass surgery in 2021 and 2022 - currently no claudication symptoms Hypertension    Pulmonary:   Sleep Apnea Chronic cough       Physical Exam  General: Well nourished, Cooperative, Alert and Oriented    Airway:  Mallampati: II   Mouth Opening: Normal  TM Distance: Normal  Tongue: Normal  Neck ROM: Normal ROM    Dental:  Periodontal disease    Chest/Lungs:  Clear to auscultation, Normal Respiratory Rate  +cough  Heart:  Rate: Normal  Rhythm: Regular Rhythm    Skin:  Ecchymosis        Anesthesia Plan  Type of Anesthesia, risks & benefits discussed:    Anesthesia Type: Spinal  Intra-op Monitoring Plan: Standard ASA Monitors  Post Op Pain Control Plan: multimodal analgesia  Informed Consent: Patient consented to blood products? Yes  ASA Score: 3  Day of Surgery Review of History & Physical: H&P Update referred to the surgeon/provider.  Anesthesia Plan Notes: ERAS ordered by surgeon  according to [facility] protocol  Goal directed IV Fluid therapy  No valladares necessary unless hx of BPH/urinary retention   In PACU will perform postop pain adductor canal block for postop pain control with Bupiv 0.5% and Exparel as requested by Dr. Barron     Pt with PAD s/p bypass - per pt the plan is for NO tourniquet    Ready For Surgery From Anesthesia Perspective.     .

## 2023-10-31 ENCOUNTER — HOSPITAL ENCOUNTER (INPATIENT)
Facility: HOSPITAL | Age: 63
LOS: 1 days | Discharge: HOME | DRG: 470 | End: 2023-11-01
Attending: SPECIALIST | Admitting: SPECIALIST
Payer: MEDICARE

## 2023-10-31 ENCOUNTER — ANESTHESIA (OUTPATIENT)
Dept: SURGERY | Facility: HOSPITAL | Age: 63
DRG: 470 | End: 2023-10-31
Payer: MEDICARE

## 2023-10-31 DIAGNOSIS — Z01.812 PRE-OPERATIVE LABORATORY EXAMINATION: ICD-10-CM

## 2023-10-31 DIAGNOSIS — M17.11 PRIMARY OSTEOARTHRITIS OF RIGHT KNEE: Primary | ICD-10-CM

## 2023-10-31 DIAGNOSIS — I73.9 PERIPHERAL ARTERIAL DISEASE: ICD-10-CM

## 2023-10-31 DIAGNOSIS — I10 HYPERTENSION, UNSPECIFIED TYPE: ICD-10-CM

## 2023-10-31 DIAGNOSIS — R79.9 ABNORMAL BLOOD CHEMISTRY: ICD-10-CM

## 2023-10-31 DIAGNOSIS — Z95.828 HISTORY OF EXTREMITY BYPASS GRAFT: ICD-10-CM

## 2023-10-31 DIAGNOSIS — R26.89 IMPAIRED GAIT AND MOBILITY: ICD-10-CM

## 2023-10-31 LAB
HCT VFR BLD AUTO: 41.3 % (ref 42–52)
HGB BLD-MCNC: 13.6 G/DL (ref 14–18)
POCT GLUCOSE: 99 MG/DL (ref 70–110)

## 2023-10-31 PROCEDURE — D9220A PRA ANESTHESIA: ICD-10-PCS | Mod: ANES,,, | Performed by: ANESTHESIOLOGY

## 2023-10-31 PROCEDURE — 25000003 PHARM REV CODE 250

## 2023-10-31 PROCEDURE — 0055T PR COMPUTER-ASSIST MUSCSKEL NAVIG, ORTHO PROC, CT/MRI: ICD-10-PCS | Mod: ,,, | Performed by: SPECIALIST

## 2023-10-31 PROCEDURE — 37000009 HC ANESTHESIA EA ADD 15 MINS: Performed by: SPECIALIST

## 2023-10-31 PROCEDURE — 88311 DECALCIFY TISSUE: CPT

## 2023-10-31 PROCEDURE — 37000008 HC ANESTHESIA 1ST 15 MINUTES: Performed by: SPECIALIST

## 2023-10-31 PROCEDURE — 0055T BONE SRGRY CMPTR CT/MRI IMAG: CPT | Mod: ,,, | Performed by: SPECIALIST

## 2023-10-31 PROCEDURE — D9220A PRA ANESTHESIA: Mod: ANES,,, | Performed by: ANESTHESIOLOGY

## 2023-10-31 PROCEDURE — 99900031 HC PATIENT EDUCATION (STAT)

## 2023-10-31 PROCEDURE — C1713 ANCHOR/SCREW BN/BN,TIS/BN: HCPCS | Performed by: SPECIALIST

## 2023-10-31 PROCEDURE — 94761 N-INVAS EAR/PLS OXIMETRY MLT: CPT

## 2023-10-31 PROCEDURE — C1776 JOINT DEVICE (IMPLANTABLE): HCPCS | Performed by: SPECIALIST

## 2023-10-31 PROCEDURE — 25000003 PHARM REV CODE 250: Performed by: SPECIALIST

## 2023-10-31 PROCEDURE — A4216 STERILE WATER/SALINE, 10 ML: HCPCS | Performed by: SPECIALIST

## 2023-10-31 PROCEDURE — 97162 PT EVAL MOD COMPLEX 30 MIN: CPT

## 2023-10-31 PROCEDURE — 51798 US URINE CAPACITY MEASURE: CPT

## 2023-10-31 PROCEDURE — 88305 TISSUE EXAM BY PATHOLOGIST: CPT | Performed by: SPECIALIST

## 2023-10-31 PROCEDURE — 63600175 PHARM REV CODE 636 W HCPCS: Performed by: SPECIALIST

## 2023-10-31 PROCEDURE — 25000003 PHARM REV CODE 250: Performed by: PHYSICIAN ASSISTANT

## 2023-10-31 PROCEDURE — 36000713 HC OR TIME LEV V EA ADD 15 MIN: Performed by: SPECIALIST

## 2023-10-31 PROCEDURE — 71000039 HC RECOVERY, EACH ADD'L HOUR: Performed by: SPECIALIST

## 2023-10-31 PROCEDURE — D9220A PRA ANESTHESIA: Mod: CRNA,,,

## 2023-10-31 PROCEDURE — 27447 TOTAL KNEE ARTHROPLASTY: CPT | Mod: RT,,, | Performed by: SPECIALIST

## 2023-10-31 PROCEDURE — 27447 PR TOTAL KNEE ARTHROPLASTY: ICD-10-PCS | Mod: RT,,, | Performed by: SPECIALIST

## 2023-10-31 PROCEDURE — 63600175 PHARM REV CODE 636 W HCPCS

## 2023-10-31 PROCEDURE — 71000033 HC RECOVERY, INTIAL HOUR: Performed by: SPECIALIST

## 2023-10-31 PROCEDURE — 27201423 OPTIME MED/SURG SUP & DEVICES STERILE SUPPLY: Performed by: SPECIALIST

## 2023-10-31 PROCEDURE — 36000712 HC OR TIME LEV V 1ST 15 MIN: Performed by: SPECIALIST

## 2023-10-31 PROCEDURE — 11000001 HC ACUTE MED/SURG PRIVATE ROOM

## 2023-10-31 PROCEDURE — 94799 UNLISTED PULMONARY SVC/PX: CPT

## 2023-10-31 PROCEDURE — 85014 HEMATOCRIT: CPT | Performed by: SPECIALIST

## 2023-10-31 PROCEDURE — 25000003 PHARM REV CODE 250: Performed by: ANESTHESIOLOGY

## 2023-10-31 PROCEDURE — D9220A PRA ANESTHESIA: ICD-10-PCS | Mod: CRNA,,,

## 2023-10-31 PROCEDURE — 27000221 HC OXYGEN, UP TO 24 HOURS

## 2023-10-31 PROCEDURE — 27447 PR TOTAL KNEE ARTHROPLASTY: ICD-10-PCS | Mod: AS,RT,, | Performed by: PHYSICIAN ASSISTANT

## 2023-10-31 PROCEDURE — 27447 TOTAL KNEE ARTHROPLASTY: CPT | Mod: AS,RT,, | Performed by: PHYSICIAN ASSISTANT

## 2023-10-31 DEVICE — TIBIAL BEARING INSERT - CS
Type: IMPLANTABLE DEVICE | Site: KNEE | Status: FUNCTIONAL
Brand: TRIATHLON

## 2023-10-31 DEVICE — CRUCIATE RETAINING FEMORAL
Type: IMPLANTABLE DEVICE | Site: KNEE | Status: FUNCTIONAL
Brand: TRIATHLON

## 2023-10-31 DEVICE — TIBIAL COMPONENT
Type: IMPLANTABLE DEVICE | Site: KNEE | Status: FUNCTIONAL
Brand: TRIATHLON

## 2023-10-31 RX ORDER — ACETAMINOPHEN 500 MG
1000 TABLET ORAL
Status: COMPLETED | OUTPATIENT
Start: 2023-10-31 | End: 2023-10-31

## 2023-10-31 RX ORDER — ONDANSETRON 2 MG/ML
4 INJECTION INTRAMUSCULAR; INTRAVENOUS DAILY PRN
Status: ACTIVE | OUTPATIENT
Start: 2023-10-31

## 2023-10-31 RX ORDER — KETOROLAC TROMETHAMINE 30 MG/ML
INJECTION, SOLUTION INTRAMUSCULAR; INTRAVENOUS
Status: DISPENSED
Start: 2023-10-31 | End: 2023-10-31

## 2023-10-31 RX ORDER — POLYETHYLENE GLYCOL 3350 17 G/17G
17 POWDER, FOR SOLUTION ORAL NIGHTLY
Status: DISCONTINUED | OUTPATIENT
Start: 2023-10-31 | End: 2023-11-01 | Stop reason: HOSPADM

## 2023-10-31 RX ORDER — DOCUSATE SODIUM 100 MG/1
200 CAPSULE, LIQUID FILLED ORAL DAILY
Status: DISCONTINUED | OUTPATIENT
Start: 2023-11-01 | End: 2023-11-01 | Stop reason: HOSPADM

## 2023-10-31 RX ORDER — EPINEPHRINE 1 MG/ML
INJECTION, SOLUTION, CONCENTRATE INTRAVENOUS
Status: DISPENSED
Start: 2023-10-31 | End: 2023-10-31

## 2023-10-31 RX ORDER — SODIUM CHLORIDE, SODIUM GLUCONATE, SODIUM ACETATE, POTASSIUM CHLORIDE AND MAGNESIUM CHLORIDE 30; 37; 368; 526; 502 MG/100ML; MG/100ML; MG/100ML; MG/100ML; MG/100ML
INJECTION, SOLUTION INTRAVENOUS CONTINUOUS
Status: ACTIVE | OUTPATIENT
Start: 2023-10-31 | End: 2023-11-30

## 2023-10-31 RX ORDER — MIDAZOLAM HYDROCHLORIDE 1 MG/ML
.5-4 INJECTION INTRAMUSCULAR; INTRAVENOUS
Status: ACTIVE | OUTPATIENT
Start: 2023-10-31

## 2023-10-31 RX ORDER — ONDANSETRON 4 MG/1
4 TABLET, ORALLY DISINTEGRATING ORAL
Status: COMPLETED | OUTPATIENT
Start: 2023-10-31 | End: 2023-10-31

## 2023-10-31 RX ORDER — PROCHLORPERAZINE EDISYLATE 5 MG/ML
5 INJECTION INTRAMUSCULAR; INTRAVENOUS EVERY 30 MIN PRN
Status: ACTIVE | OUTPATIENT
Start: 2023-10-31

## 2023-10-31 RX ORDER — MEPERIDINE HYDROCHLORIDE 25 MG/ML
6.25 INJECTION INTRAMUSCULAR; INTRAVENOUS; SUBCUTANEOUS ONCE AS NEEDED
Status: ACTIVE | OUTPATIENT
Start: 2023-10-31 | End: 2023-11-01

## 2023-10-31 RX ORDER — FAMOTIDINE 20 MG/1
20 TABLET, FILM COATED ORAL 2 TIMES DAILY
Status: DISCONTINUED | OUTPATIENT
Start: 2023-10-31 | End: 2023-11-01 | Stop reason: HOSPADM

## 2023-10-31 RX ORDER — GABAPENTIN 300 MG/1
300 CAPSULE ORAL
Status: COMPLETED | OUTPATIENT
Start: 2023-10-31 | End: 2023-10-31

## 2023-10-31 RX ORDER — ROPIVACAINE HYDROCHLORIDE 5 MG/ML
INJECTION, SOLUTION EPIDURAL; INFILTRATION; PERINEURAL
Status: DISCONTINUED | OUTPATIENT
Start: 2023-10-31 | End: 2023-10-31 | Stop reason: HOSPADM

## 2023-10-31 RX ORDER — IPRATROPIUM BROMIDE 42 UG/1
2 SPRAY, METERED NASAL 2 TIMES DAILY
Status: CANCELLED | OUTPATIENT
Start: 2023-10-31

## 2023-10-31 RX ORDER — NAPROXEN SODIUM 220 MG/1
81 TABLET, FILM COATED ORAL 2 TIMES DAILY
Status: DISCONTINUED | OUTPATIENT
Start: 2023-11-01 | End: 2023-11-01 | Stop reason: HOSPADM

## 2023-10-31 RX ORDER — MORPHINE SULFATE 10 MG/ML
INJECTION INTRAMUSCULAR; INTRAVENOUS; SUBCUTANEOUS
Status: DISCONTINUED | OUTPATIENT
Start: 2023-10-31 | End: 2023-10-31 | Stop reason: HOSPADM

## 2023-10-31 RX ORDER — DEXAMETHASONE SODIUM PHOSPHATE 4 MG/ML
4 INJECTION, SOLUTION INTRA-ARTICULAR; INTRALESIONAL; INTRAMUSCULAR; INTRAVENOUS; SOFT TISSUE ONCE
Status: ACTIVE | OUTPATIENT
Start: 2023-10-31

## 2023-10-31 RX ORDER — ONDANSETRON 2 MG/ML
4 INJECTION INTRAMUSCULAR; INTRAVENOUS EVERY 6 HOURS PRN
Status: DISCONTINUED | OUTPATIENT
Start: 2023-10-31 | End: 2023-11-01 | Stop reason: HOSPADM

## 2023-10-31 RX ORDER — MORPHINE SULFATE 10 MG/ML
INJECTION INTRAMUSCULAR; INTRAVENOUS; SUBCUTANEOUS
Status: DISPENSED
Start: 2023-10-31 | End: 2023-10-31

## 2023-10-31 RX ORDER — LEVETIRACETAM 250 MG/1
250 TABLET ORAL 2 TIMES DAILY
Status: CANCELLED | OUTPATIENT
Start: 2023-10-31

## 2023-10-31 RX ORDER — SODIUM CHLORIDE, SODIUM GLUCONATE, SODIUM ACETATE, POTASSIUM CHLORIDE AND MAGNESIUM CHLORIDE 30; 37; 368; 526; 502 MG/100ML; MG/100ML; MG/100ML; MG/100ML; MG/100ML
INJECTION, SOLUTION INTRAVENOUS CONTINUOUS
Status: DISCONTINUED | OUTPATIENT
Start: 2023-10-31 | End: 2023-11-01 | Stop reason: HOSPADM

## 2023-10-31 RX ORDER — BUPIVACAINE HYDROCHLORIDE 2.5 MG/ML
INJECTION, SOLUTION EPIDURAL; INFILTRATION; INTRACAUDAL
Status: DISPENSED
Start: 2023-10-31 | End: 2023-10-31

## 2023-10-31 RX ORDER — BUPIVACAINE HYDROCHLORIDE 7.5 MG/ML
INJECTION, SOLUTION EPIDURAL; RETROBULBAR
Status: COMPLETED | OUTPATIENT
Start: 2023-10-31 | End: 2023-10-31

## 2023-10-31 RX ORDER — METHOCARBAMOL 750 MG/1
750 TABLET, FILM COATED ORAL EVERY 8 HOURS PRN
Status: DISCONTINUED | OUTPATIENT
Start: 2023-10-31 | End: 2023-11-01 | Stop reason: HOSPADM

## 2023-10-31 RX ORDER — HYDROMORPHONE HYDROCHLORIDE 2 MG/ML
0.4 INJECTION, SOLUTION INTRAMUSCULAR; INTRAVENOUS; SUBCUTANEOUS EVERY 5 MIN PRN
Status: ACTIVE | OUTPATIENT
Start: 2023-10-31

## 2023-10-31 RX ORDER — LACTULOSE 10 G/15ML
20 SOLUTION ORAL EVERY 6 HOURS PRN
Status: DISCONTINUED | OUTPATIENT
Start: 2023-10-31 | End: 2023-11-01 | Stop reason: HOSPADM

## 2023-10-31 RX ORDER — SODIUM CHLORIDE 9 MG/ML
INJECTION, SOLUTION INTRAVENOUS CONTINUOUS
Status: DISCONTINUED | OUTPATIENT
Start: 2023-10-31 | End: 2023-11-01 | Stop reason: HOSPADM

## 2023-10-31 RX ORDER — EPINEPHRINE 1 MG/ML
INJECTION, SOLUTION, CONCENTRATE INTRAVENOUS
Status: DISCONTINUED | OUTPATIENT
Start: 2023-10-31 | End: 2023-10-31 | Stop reason: HOSPADM

## 2023-10-31 RX ORDER — DEXAMETHASONE SODIUM PHOSPHATE 4 MG/ML
INJECTION, SOLUTION INTRA-ARTICULAR; INTRALESIONAL; INTRAMUSCULAR; INTRAVENOUS; SOFT TISSUE
Status: DISCONTINUED | OUTPATIENT
Start: 2023-10-31 | End: 2023-10-31

## 2023-10-31 RX ORDER — METOCLOPRAMIDE HYDROCHLORIDE 5 MG/ML
10 INJECTION INTRAMUSCULAR; INTRAVENOUS
Status: DISCONTINUED | OUTPATIENT
Start: 2023-10-31 | End: 2023-11-01 | Stop reason: HOSPADM

## 2023-10-31 RX ORDER — MORPHINE SULFATE 4 MG/ML
4 INJECTION, SOLUTION INTRAMUSCULAR; INTRAVENOUS
Status: ACTIVE | OUTPATIENT
Start: 2023-10-31 | End: 2023-11-01

## 2023-10-31 RX ORDER — ROPIVACAINE HYDROCHLORIDE 5 MG/ML
INJECTION, SOLUTION EPIDURAL; INFILTRATION; PERINEURAL
Status: DISPENSED
Start: 2023-10-31 | End: 2023-10-31

## 2023-10-31 RX ORDER — MIDAZOLAM HYDROCHLORIDE 1 MG/ML
INJECTION INTRAMUSCULAR; INTRAVENOUS
Status: DISCONTINUED | OUTPATIENT
Start: 2023-10-31 | End: 2023-10-31

## 2023-10-31 RX ORDER — AMOXICILLIN 250 MG
2 CAPSULE ORAL 2 TIMES DAILY
Status: DISCONTINUED | OUTPATIENT
Start: 2023-10-31 | End: 2023-11-01 | Stop reason: HOSPADM

## 2023-10-31 RX ORDER — TAMSULOSIN HYDROCHLORIDE 0.4 MG/1
1 CAPSULE ORAL NIGHTLY
Status: CANCELLED | OUTPATIENT
Start: 2023-10-31

## 2023-10-31 RX ORDER — PHENYLEPHRINE HYDROCHLORIDE 10 MG/ML
INJECTION INTRAVENOUS
Status: DISCONTINUED | OUTPATIENT
Start: 2023-10-31 | End: 2023-10-31

## 2023-10-31 RX ORDER — KETOROLAC TROMETHAMINE 10 MG/1
10 TABLET, FILM COATED ORAL
Status: COMPLETED | OUTPATIENT
Start: 2023-10-31 | End: 2023-10-31

## 2023-10-31 RX ORDER — TRANEXAMIC ACID 650 MG/1
1950 TABLET ORAL
Status: COMPLETED | OUTPATIENT
Start: 2023-10-31 | End: 2023-10-31

## 2023-10-31 RX ORDER — ACETAMINOPHEN 10 MG/ML
1000 INJECTION, SOLUTION INTRAVENOUS ONCE
Status: COMPLETED | OUTPATIENT
Start: 2023-10-31 | End: 2023-10-31

## 2023-10-31 RX ORDER — KETOROLAC TROMETHAMINE 30 MG/ML
INJECTION, SOLUTION INTRAMUSCULAR; INTRAVENOUS
Status: DISCONTINUED | OUTPATIENT
Start: 2023-10-31 | End: 2023-10-31 | Stop reason: HOSPADM

## 2023-10-31 RX ORDER — FENTANYL CITRATE 50 UG/ML
25-200 INJECTION, SOLUTION INTRAMUSCULAR; INTRAVENOUS
Status: DISCONTINUED | OUTPATIENT
Start: 2023-10-31 | End: 2023-10-31

## 2023-10-31 RX ORDER — PROPOFOL 10 MG/ML
VIAL (ML) INTRAVENOUS CONTINUOUS PRN
Status: DISCONTINUED | OUTPATIENT
Start: 2023-10-31 | End: 2023-10-31

## 2023-10-31 RX ORDER — SODIUM CHLORIDE 9 MG/ML
INJECTION, SOLUTION INTRAMUSCULAR; INTRAVENOUS; SUBCUTANEOUS
Status: DISCONTINUED | OUTPATIENT
Start: 2023-10-31 | End: 2023-10-31 | Stop reason: HOSPADM

## 2023-10-31 RX ORDER — TALC
6 POWDER (GRAM) TOPICAL NIGHTLY PRN
Status: DISCONTINUED | OUTPATIENT
Start: 2023-10-31 | End: 2023-11-01 | Stop reason: HOSPADM

## 2023-10-31 RX ORDER — CETIRIZINE HYDROCHLORIDE 10 MG/1
10 TABLET ORAL DAILY
COMMUNITY

## 2023-10-31 RX ORDER — DEXAMETHASONE SODIUM PHOSPHATE 4 MG/ML
INJECTION, SOLUTION INTRA-ARTICULAR; INTRALESIONAL; INTRAMUSCULAR; INTRAVENOUS; SOFT TISSUE
Status: DISPENSED
Start: 2023-10-31 | End: 2023-10-31

## 2023-10-31 RX ORDER — BISACODYL 10 MG
10 SUPPOSITORY, RECTAL RECTAL DAILY
Status: DISCONTINUED | OUTPATIENT
Start: 2023-11-03 | End: 2023-11-01 | Stop reason: HOSPADM

## 2023-10-31 RX ORDER — KETOROLAC TROMETHAMINE 10 MG/1
10 TABLET, FILM COATED ORAL EVERY 6 HOURS
Status: CANCELLED | OUTPATIENT
Start: 2023-10-31 | End: 2023-11-05

## 2023-10-31 RX ORDER — ONDANSETRON 2 MG/ML
INJECTION INTRAMUSCULAR; INTRAVENOUS
Status: DISCONTINUED | OUTPATIENT
Start: 2023-10-31 | End: 2023-10-31

## 2023-10-31 RX ORDER — HYDROCODONE BITARTRATE AND ACETAMINOPHEN 5; 325 MG/1; MG/1
1 TABLET ORAL EVERY 4 HOURS PRN
Status: DISCONTINUED | OUTPATIENT
Start: 2023-10-31 | End: 2023-11-01 | Stop reason: HOSPADM

## 2023-10-31 RX ORDER — TRAZODONE HYDROCHLORIDE 50 MG/1
50 TABLET ORAL NIGHTLY
Status: CANCELLED | OUTPATIENT
Start: 2023-10-31

## 2023-10-31 RX ORDER — METOPROLOL TARTRATE 50 MG/1
50 TABLET ORAL 2 TIMES DAILY
Status: CANCELLED | OUTPATIENT
Start: 2023-10-31

## 2023-10-31 RX ORDER — TRAMADOL HYDROCHLORIDE 50 MG/1
50 TABLET ORAL EVERY 4 HOURS PRN
Status: DISCONTINUED | OUTPATIENT
Start: 2023-10-31 | End: 2023-11-01 | Stop reason: HOSPADM

## 2023-10-31 RX ORDER — SODIUM CHLORIDE 0.9 % (FLUSH) 0.9 %
SYRINGE (ML) INJECTION
Status: DISPENSED
Start: 2023-10-31 | End: 2023-10-31

## 2023-10-31 RX ORDER — BUPIVACAINE HYDROCHLORIDE 2.5 MG/ML
30 INJECTION, SOLUTION EPIDURAL; INFILTRATION; INTRACAUDAL ONCE
Status: ACTIVE | OUTPATIENT
Start: 2023-10-31

## 2023-10-31 RX ORDER — GABAPENTIN 300 MG/1
300 CAPSULE ORAL NIGHTLY
Status: DISCONTINUED | OUTPATIENT
Start: 2023-10-31 | End: 2023-11-01 | Stop reason: HOSPADM

## 2023-10-31 RX ORDER — GENTAMICIN SULFATE 40 MG/ML
INJECTION, SOLUTION INTRAMUSCULAR; INTRAVENOUS
Status: DISPENSED
Start: 2023-10-31 | End: 2023-10-31

## 2023-10-31 RX ORDER — VANCOMYCIN HYDROCHLORIDE 1 G/20ML
INJECTION, POWDER, LYOPHILIZED, FOR SOLUTION INTRAVENOUS
Status: DISCONTINUED
Start: 2023-10-31 | End: 2023-10-31 | Stop reason: WASHOUT

## 2023-10-31 RX ORDER — GLYCOPYRROLATE 0.2 MG/ML
INJECTION INTRAMUSCULAR; INTRAVENOUS
Status: DISCONTINUED | OUTPATIENT
Start: 2023-10-31 | End: 2023-10-31

## 2023-10-31 RX ORDER — LANOLIN ALCOHOL/MO/W.PET/CERES
1 CREAM (GRAM) TOPICAL DAILY
Status: CANCELLED | OUTPATIENT
Start: 2023-10-31

## 2023-10-31 RX ORDER — CEFAZOLIN SODIUM IN 0.9 % NACL 2 G/100 ML
PLASTIC BAG, INJECTION (ML) INTRAVENOUS
Status: DISCONTINUED | OUTPATIENT
Start: 2023-10-31 | End: 2023-10-31

## 2023-10-31 RX ORDER — HYDROCODONE BITARTRATE AND ACETAMINOPHEN 5; 325 MG/1; MG/1
1 TABLET ORAL
Status: ACTIVE | OUTPATIENT
Start: 2023-10-31

## 2023-10-31 RX ORDER — SODIUM CHLORIDE, SODIUM LACTATE, POTASSIUM CHLORIDE, CALCIUM CHLORIDE 600; 310; 30; 20 MG/100ML; MG/100ML; MG/100ML; MG/100ML
INJECTION, SOLUTION INTRAVENOUS CONTINUOUS
Status: ACTIVE | OUTPATIENT
Start: 2023-10-31

## 2023-10-31 RX ORDER — SCOLOPAMINE TRANSDERMAL SYSTEM 1 MG/1
1 PATCH, EXTENDED RELEASE TRANSDERMAL ONCE AS NEEDED
Status: DISCONTINUED | OUTPATIENT
Start: 2023-10-31 | End: 2023-10-31 | Stop reason: HOSPADM

## 2023-10-31 RX ORDER — MAG HYDROX/ALUMINUM HYD/SIMETH 200-200-20
30 SUSPENSION, ORAL (FINAL DOSE FORM) ORAL EVERY 6 HOURS PRN
Status: DISCONTINUED | OUTPATIENT
Start: 2023-10-31 | End: 2023-11-01 | Stop reason: HOSPADM

## 2023-10-31 RX ADMIN — PHENYLEPHRINE HYDROCHLORIDE 100 MCG: 10 INJECTION INTRAVENOUS at 10:10

## 2023-10-31 RX ADMIN — METOCLOPRAMIDE HYDROCHLORIDE 10 MG: 5 INJECTION INTRAMUSCULAR; INTRAVENOUS at 02:10

## 2023-10-31 RX ADMIN — GABAPENTIN 300 MG: 300 CAPSULE ORAL at 09:10

## 2023-10-31 RX ADMIN — ACETAMINOPHEN 1000 MG: 10 INJECTION INTRAVENOUS at 05:10

## 2023-10-31 RX ADMIN — METOCLOPRAMIDE HYDROCHLORIDE 10 MG: 5 INJECTION INTRAMUSCULAR; INTRAVENOUS at 07:10

## 2023-10-31 RX ADMIN — Medication 2 G: at 09:10

## 2023-10-31 RX ADMIN — TRANEXAMIC ACID 1950 MG: 650 TABLET ORAL at 09:10

## 2023-10-31 RX ADMIN — BUPIVACAINE HYDROCHLORIDE 1.5 ML: 7.5 INJECTION, SOLUTION EPIDURAL; RETROBULBAR at 09:10

## 2023-10-31 RX ADMIN — SENNOSIDES AND DOCUSATE SODIUM 2 TABLET: 8.6; 5 TABLET ORAL at 08:10

## 2023-10-31 RX ADMIN — SODIUM CHLORIDE, SODIUM GLUCONATE, SODIUM ACETATE, POTASSIUM CHLORIDE AND MAGNESIUM CHLORIDE: 526; 502; 368; 37; 30 INJECTION, SOLUTION INTRAVENOUS at 11:10

## 2023-10-31 RX ADMIN — SODIUM CHLORIDE, SODIUM GLUCONATE, SODIUM ACETATE, POTASSIUM CHLORIDE AND MAGNESIUM CHLORIDE: 526; 502; 368; 37; 30 INJECTION, SOLUTION INTRAVENOUS at 09:10

## 2023-10-31 RX ADMIN — PROPOFOL 50 MCG/KG/MIN: 10 INJECTION, EMULSION INTRAVENOUS at 09:10

## 2023-10-31 RX ADMIN — PHENYLEPHRINE HYDROCHLORIDE 10 MCG/MIN: 10 INJECTION INTRAVENOUS at 09:10

## 2023-10-31 RX ADMIN — KETOROLAC TROMETHAMINE 10 MG: 10 TABLET, FILM COATED ORAL at 09:10

## 2023-10-31 RX ADMIN — DEXAMETHASONE SODIUM PHOSPHATE 4 MG: 4 INJECTION, SOLUTION INTRA-ARTICULAR; INTRALESIONAL; INTRAMUSCULAR; INTRAVENOUS; SOFT TISSUE at 09:10

## 2023-10-31 RX ADMIN — CEFAZOLIN 2 G: 2 INJECTION, POWDER, FOR SOLUTION INTRAMUSCULAR; INTRAVENOUS at 04:10

## 2023-10-31 RX ADMIN — ONDANSETRON HYDROCHLORIDE 4 MG: 2 SOLUTION INTRAMUSCULAR; INTRAVENOUS at 10:10

## 2023-10-31 RX ADMIN — MIDAZOLAM 2 MG: 1 INJECTION INTRAMUSCULAR; INTRAVENOUS at 09:10

## 2023-10-31 RX ADMIN — FAMOTIDINE 20 MG: 20 TABLET ORAL at 08:10

## 2023-10-31 RX ADMIN — ONDANSETRON 4 MG: 4 TABLET, ORALLY DISINTEGRATING ORAL at 09:10

## 2023-10-31 RX ADMIN — GABAPENTIN 300 MG: 300 CAPSULE ORAL at 08:10

## 2023-10-31 RX ADMIN — ACETAMINOPHEN 1000 MG: 500 TABLET ORAL at 09:10

## 2023-10-31 RX ADMIN — CEFAZOLIN 2 G: 2 INJECTION, POWDER, FOR SOLUTION INTRAMUSCULAR; INTRAVENOUS at 09:10

## 2023-10-31 RX ADMIN — GLYCOPYRROLATE 0.2 MG: 0.2 INJECTION INTRAMUSCULAR; INTRAVENOUS at 09:10

## 2023-10-31 RX ADMIN — SODIUM CHLORIDE: 9 INJECTION, SOLUTION INTRAVENOUS at 01:10

## 2023-10-31 RX ADMIN — POLYETHYLENE GLYCOL 3350 17 G: 17 POWDER, FOR SOLUTION ORAL at 08:10

## 2023-10-31 NOTE — OP NOTE
DATE OF PROCEDURE: 10/31/2023      PREOPERATIVE DIAGNOSIS:  Arthritis, right knee.     POSTOPERATIVE DIAGNOSIS:  Arthritis, right knee.     PROCEDURES PERFORMED:  Robotically-assisted right total knee arthroplasty.     SURGEON:  Maxwell Barron M.D.     ASSISTANT: First assistant:Kaden Swain, certified physician assistant, who was necessary and essential for all aspects of the operation, including but no limited to patient positioning, surgical exposure, bony preparation, implantation, wound closure, and dressing placement.       ANESTHESIA: spinal     COMPLICATIONS:  None.     COUNTS:  Correct.     DISPOSITION:  Recovery Room, stable.     SPECIMENS:  Bone and cartilage.     FINDINGS:  Arthritis.      ESTIMATED BLOOD LOSS:  <25ml     IMPLANTS:  Otis Triathlon size 5 right  Triathlon cruciate retaining femoral component and a size 6 primary tibial baseplate, and a size 6, 9 mm   CS tibial insert.     INDICATIONS FOR PROCEDURE:    Ean Funes is a 63 y.o. year old male    who is   having symptoms of right knee pain.  Physical examination and imaging studies   were consistent with arthritis.Treatment options were explained and it was decided   to proceed with robotically-assisted right total knee arthroplasty.  He was   aware of reasonable treatment options as well as risks and benefits.       PROCEDURE IN DETAIL:  After appropriate consent was obtained, the patient was  brought to the Operating Room, anesthesia was administered.  He received   antibiotic prophylaxis.  No tourniquet was applied.  right lower extremity was then prepped and draped in usual sterile   fashion.  The leg hamilton was applied.  Timeout was called.  Limb was elevated   and tourniquet was inflated.  The knee was flexed.  An incision was made from   the tibial tubercle just proximal to the superior pole of the patella.  It was   taken down through the skin and retinaculum.  Skin bleeders were coagulated with cautery. A medial  parapatellar arthrotomy   was performed.  Following this, the anterior cruciate ligament was resected, fat pad   was excised, and medial and lateral meniscal remnants were excised.  Pins were placed in the femur and tibia, followed by assembly and registration of the femoral and tibial arrays.  Hip center and ankle center registration was performed. Femoral and tibial checkpoints were placed and registered. Fine point registration of the femur and tibia was performed, followed by excision of osteophytes and ligament balancing.  When the plan was balanced symmetrically throughout a range of motion, robotic assisted size 5 femoral and size 6 tibial cuts were made.  Posterior oseophytes and loose bodies were excised. A size 6 tibial trial was placed and pinned posteromedially to allow for rotational adjustment and appropriate patella tracking prior to final positional pinning. I then placed a 9 mm trial tibial bearing insert along with a size 5 femoral trial.  The knee was brought into full extension and the preoperative valgus deformity was corrected appropriately, relative to the mechanical axis.  Intra-operative motion was 0 degrees to 140 degrees, with excellent medial and lateral stability in mid and deep flexion as well as extension. The patella tracked appropriately and the final tibial rotation was pinned in position.  There was symmetric ligament balancing throughout the range of motion.  The femur and tibial bone preparation was then made for implantation.  Trials were removed and bone surfaces were irrigated, suctioned, and prepared.  I then implanted a size 6 tibial component, followed by pressfit of a 9 mm polyethylene bearing. The size 5 femur was then implanted. There was excellent fixation of all components. Range of motion was 0 degrees to 140 degrees with symmetric ligament balancing and appropriate patella tracking. The knee was irrigated, suctioned, and injected for postoperative pain control. The  arthrotomy was then closed with #1 braided suture, followed by reapproximation of skin edges with 2-0 vicryl suture. Surgical staples were used for skin closure. Sterile dressings were applied. The patient was then taken to recovery room in stable condition.

## 2023-10-31 NOTE — PLAN OF CARE
Problem: Adult Inpatient Plan of Care  Goal: Plan of Care Review  Outcome: Ongoing, Progressing  Goal: Patient-Specific Goal (Individualized)  Outcome: Ongoing, Progressing  Goal: Absence of Hospital-Acquired Illness or Injury  Outcome: Ongoing, Progressing  Goal: Optimal Comfort and Wellbeing  Outcome: Ongoing, Progressing  Goal: Readiness for Transition of Care  Outcome: Ongoing, Progressing     Problem: Hypertension Comorbidity  Goal: Blood Pressure in Desired Range  Outcome: Ongoing, Progressing     Problem: Obstructive Sleep Apnea Risk or Actual Comorbidity Management  Goal: Unobstructed Breathing During Sleep  Outcome: Ongoing, Progressing

## 2023-10-31 NOTE — PROGRESS NOTES
"No acute events overnight.  Pain controlled.  Resting in bed.    Vital Signs  Temp: 97.2 °F (36.2 °C)  Temp Source: Oral  Pulse: (!) 55  Heart Rate Source: Monitor  Resp: 20  SpO2: 99 %  Pulse Oximetry Type: Intermittent  Flow (L/min): 10  Oxygen Concentration (%): 70  Device (Oxygen Therapy): Face tent  BP: (!) 158/67  BP Location: Left arm  BP Method: Automatic  Patient Position: Lying  Height and Weight  Height: 5' 6" (167.6 cm)  Height Method: Stated  Weight: 77.2 kg (170 lb 3.1 oz)  Weight Method: Standard Scale  BSA (Calculated - sq m): 1.9 sq meters  BMI (Calculated): 27.5  Weight in (lb) to have BMI = 25: 154.6]    +FHL/EHL  Brisk capillary refill distally  Dressing c/d/i  Sensation intact to light touch distally    Recent Lab Results         10/31/23  1125   10/31/23  0932        Hematocrit 41.3         Hemoglobin 13.6         POCT Glucose   99               A/P:  Status post right total knee arthroplasty  Overall patient doing well.  Therapy for mobility and ambulation.  Eliquis for DVT PPx   "

## 2023-10-31 NOTE — ANESTHESIA POSTPROCEDURE EVALUATION
Anesthesia Post Evaluation    Patient: Ean Funes    Procedure(s) Performed: Procedure(s) (LRB):  ROBOTIC ARTHROPLASTY, KNEE, TOTAL (Right)    Final Anesthesia Type: spinal      Patient location during evaluation: PACU  Patient participation: Yes- Able to Participate  Level of consciousness: awake and alert  Post-procedure vital signs: reviewed and stable  Pain management: adequate (pain controlled - unable to perform adductor block due to hx of fem/pop bypass and native femoral artery not recognzable with US - only conduit seen so procedure not attempted)  Airway patency: patent    PONV status at discharge: No PONV  Anesthetic complications: no      Cardiovascular status: blood pressure returned to baseline and hemodynamically stable  Respiratory status: unassisted and spontaneous ventilation            Vitals Value Taken Time   /67 10/31/23 1221   Temp 35.9 °C (96.6 °F) 10/31/23 1117   Pulse 52 10/31/23 1342   Resp 20 10/31/23 1342   SpO2 98 % 10/31/23 1342         Event Time   Out of Recovery 12:25:00         Pain/Nanci Score: Pain Rating Prior to Med Admin: 0 (10/31/2023  9:24 AM)  Nanci Score: 10 (10/31/2023 12:15 PM)

## 2023-10-31 NOTE — TRANSFER OF CARE
"Anesthesia Transfer of Care Note    Patient: Ean Funes    Procedure(s) Performed: Procedure(s) (LRB):  ROBOTIC ARTHROPLASTY, KNEE, TOTAL (Right)    Patient location: PACU    Anesthesia Type: spinal and other: with propofol drip     Transport from OR: Transported from OR on room air with adequate spontaneous ventilation    Post pain: adequate analgesia    Post assessment: no apparent anesthetic complications and tolerated procedure well    Post vital signs: stable    Level of consciousness: awake, alert and oriented    Nausea/Vomiting: no nausea/vomiting    Complications: none    Transfer of care protocol was followed      Last vitals:   Visit Vitals  /59 (BP Location: Left arm, Patient Position: Lying)   Pulse 61   Temp 35.9 °C (96.6 °F) (Temporal)   Resp 12   Ht 5' 6" (1.676 m)   Wt 77.2 kg (170 lb 3.1 oz)   SpO2 99%   BMI 27.47 kg/m²     "

## 2023-10-31 NOTE — PLAN OF CARE
Problem: Physical Therapy  Goal: Physical Therapy Goal  Description: Pt will improve functional independence by performing:    Bed mobility: SBA  Sit to stand: SBA with rolling walker  Bed to chair: SBA with Stand Step  with rolling walker   Car Transfer: SBA with rolling walker  Ambulation x 200'  feet with SBA and rolling walker  1 Step (Curb): Min A  and rolling walker  3 Steps: Min A  and L HR  right knee AROM flexion (in degrees): 90  right knee AROM extension (in degrees): 0   Independent with total knee HEP     Outcome: Ongoing, Progressing

## 2023-10-31 NOTE — ANESTHESIA PROCEDURE NOTES
Spinal    Diagnosis: Osteoarthritis  Patient location during procedure: OR  Start time: 10/31/2023 9:40 AM  Timeout: 10/31/2023 9:38 AM  End time: 10/31/2023 9:44 AM    Staffing  Authorizing Provider: Criselda Munoz MD  Performing Provider: Criselda Munoz MD    Staffing  Performed by: Criselda Munoz MD  Authorized by: Criselda Munoz MD    Preanesthetic Checklist  Completed: patient identified, IV checked, site marked, risks and benefits discussed, surgical consent, monitors and equipment checked, pre-op evaluation and timeout performed  Spinal Block  Patient position: sitting  Prep: ChloraPrep  Patient monitoring: heart rate, cardiac monitor, continuous pulse ox and frequent blood pressure checks  Approach: midline  Location: L4-5  Injection technique: single shot  CSF Fluid: clear free-flowing CSF  Needle  Needle type: pencil-tip   Needle gauge: 25 G  Needle length: 3.5 in  Additional Documentation: incremental injection  Needle localization: anatomical landmarks  Assessment  Ease of block: easy  Patient's tolerance of the procedure: comfortable throughout block and no complaints  Medications:    Medications: bupivacaine (pf) (MARCAINE) injection 0.75% - Intraspinal   1.5 mL - 10/31/2023 9:44:00 AM

## 2023-10-31 NOTE — NURSING
.Nurses Note -- 4 Eyes      10/31/2023   5:48 PM      Skin assessed during: Admit      [x] No Altered Skin Integrity Present    []Prevention Measures Documented      [] Yes- Altered Skin Integrity Present or Discovered   [] LDA Added if Not in Epic (Describe Wound)   [] New Altered Skin Integrity was Present on Admit and Documented in LDA   [] Wound Image Taken    Wound Care Consulted? No    Attending Nurse: Page Oliva RN/Staff Member:    tyler Estrada

## 2023-10-31 NOTE — ANESTHESIA PROCEDURE NOTES
Peripheral Block    Patient location during procedure: pre-op   Block not for primary anesthetic.  Reason for block: at surgeon's request and post-op pain management   Post-op Pain Location: Knee - Right      Staffing  Authorizing Provider: Criselda Mnuoz MD  Performing Provider: Criselda Munoz MD    Staffing  Performed by: Criselda Munoz MD  Authorized by: Criselda Munoz MD    Preanesthetic Checklist  Completed: patient identified, IV checked, site marked, risks and benefits discussed, surgical consent, monitors and equipment checked, pre-op evaluation and timeout performed  Peripheral Block  Patient position: supine  Prep: ChloraPrep  Patient monitoring: heart rate, cardiac monitor, continuous pulse ox, continuous capnometry and frequent blood pressure checks  Block type: adductor canal  Laterality: right  Injection technique: single shot  Needle  Needle type: Stimuplex   Needle gauge: 20 G  Needle length: 4 in  Needle localization: ultrasound guidance and anatomical landmarks   -ultrasound image captured on disc.  Assessment  Injection assessment: negative aspiration, negative parasthesia and local visualized surrounding nerve  Paresthesia pain: none  Heart rate change: no  Slow fractionated injection: yes  Pain Tolerance: comfortable throughout block and no complaints  Medications:    Medications: bupivacaine (pf) (MARCAINE) injection 0.25% - Perineural   30 mL -   dexAMETHasone sodium phos (PF) injection 10 mg/mL - Other   4 mg -     Additional Notes  VSS.  RN monitoring vitals throughout procedure.  Patient tolerated procedure well.    Ultrasound guidance used to visualize the nerve bundle and sheath as well as confirm needle placement and deposition of the local anesthetic.  (1) Under ultrasound guidance, needle was inserted and placed in close proximity to the nerve bundle  (2) Ultrasound was also used to visualize the spread of the anesthetic in close proximity to the femoral artery  (3)  The nerves appeared anatomically normal  (4) There were no apparent abnormal pathological findings    Ultrasound photos archived.  Pt tolerated procedure well. There were no immediate complications.

## 2023-10-31 NOTE — PT/OT/SLP EVAL
Physical Therapy Evaluation    Patient Name:  Ean Funes   MRN:  [id number]    Recommendations:     Discharge Recommendations: Low Intensity Therapy   Discharge Equipment Recommendations: walker, rolling   Barriers to discharge: None    Assessment:     Ean Funes is a 63 y.o. male admitted with a medical diagnosis of Primary osteoarthritis of right knee.  He presents with the following impairments/functional limitations: weakness, impaired endurance, impaired functional mobility, decreased lower extremity function, pain, decreased ROM, edema, orthopedic precautions .    Rehab Prognosis: Good; patient would benefit from acute skilled PT services to address these deficits and reach maximum level of function.    Recent Surgery: Procedure(s) (LRB):  ROBOTIC ARTHROPLASTY, KNEE, TOTAL (Right) Day of Surgery    Plan:     During this hospitalization, patient to be seen BID to address the identified rehab impairments via gait training, therapeutic activities, therapeutic exercises and progress toward the following goals:    Plan of Care Expires:  11/06/23    Subjective     Chief Complaint: R LE pain  Patient/Family Comments/goals:   Pain/Comfort:  Location - Side 1: Right  Location 1: knee  Pain Addressed 1: Pre-medicate for activity, Reposition, Distraction, Cessation of Activity    Patients cultural, spiritual, Sikh conflicts given the current situation:      Living Environment:  Pt lives in single story home with significant other, 2 steps to enter with R.  Prior to admission, patients level of function was independent.  Equipment used at home: walker, rolling.  DME owned (not currently used): rolling walker.  Upon discharge, patient will have assistance from significant other.    Objective:     Communicated with nurse prior to session.  Patient found supine with peripheral IV  upon PT entry to room.    General Precautions: Standard, fall  Orthopedic Precautions:RLE weight bearing as tolerated    Braces:    Respiratory Status: Room air    Exams:  RLE ROM:     (In degrees) AROM PROM   R knee flexion 90 95   R knee extension 0       RLE Strength: NT dt sx side  LLE ROM: WFL  LLE Strength: WFL    Functional Mobility:  Bed Mobility:     Supine to Sit: stand by assistance  Transfers:     Sit to Stand:  contact guard assistance with rolling walker  Gait: Pt ambulated 150 ft. W rw and CGA, using step through gait pattern at normal pace.      Treatment & Education:  Pt edu on total knee protocol and importance of frequent mobility  Pt completed prehab prior to sx.    Patient left up in chair with all lines intact, call button in reach, nurse notified, and significant other present.    GOALS:   Multidisciplinary Problems       Physical Therapy Goals          Problem: Physical Therapy    Goal Priority Disciplines Outcome Goal Variances Interventions   Physical Therapy Goal     PT, PT/OT Ongoing, Progressing     Description: Pt will improve functional independence by performing:    Bed mobility: SBA  Sit to stand: SBA with rolling walker  Bed to chair: SBA with Stand Step  with rolling walker   Car Transfer: SBA with rolling walker  Ambulation x 200'  feet with SBA and rolling walker  1 Step (Curb): Min A  and rolling walker  3 Steps: Min A  and L HR  right knee AROM flexion (in degrees): 90  right knee AROM extension (in degrees): 0   Independent with total knee HEP                          History:     Past Medical History:   Diagnosis Date    Coronary artery disease     Digestive disorder     High cholesterol     Hypertension     Insomnia     Peripheral arterial disease 10/31/2023    Seasonal allergies     Seizures     Sleep apnea     noncompliant with cpap    Stroke 2012    [facility] stroke, lost peripherial vision on Left    Voiding dysfunction        Past Surgical History:   Procedure Laterality Date    BYPASS GRAFT Left 03/2021    iliac to groin to ankle  *no tourniquet*    BYPASS GRAFT Right 08/2022    groin to  ankle *no tourniquet*    CAROTID STENT Right 2017    100%  blocked    carotid stent Left 2021    80% blocked    CATARACT EXTRACTION W/  INTRAOCULAR LENS IMPLANT Bilateral     COLONOSCOPY      CORONARY ARTERY BYPASS GRAFT  2017    x 3 bypass, became infected, flap on left side    MOUTH SURGERY  12/2022    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

## 2023-10-31 NOTE — ADDENDUM NOTE
Addendum  created 10/31/23 1515 by Criselda Munoz MD    Delete clinical note, Intraprocedure Blocks edited, Order Canceled from Note

## 2023-11-01 VITALS
WEIGHT: 170.19 LBS | BODY MASS INDEX: 27.35 KG/M2 | HEART RATE: 78 BPM | OXYGEN SATURATION: 95 % | TEMPERATURE: 98 F | HEIGHT: 66 IN | SYSTOLIC BLOOD PRESSURE: 147 MMHG | DIASTOLIC BLOOD PRESSURE: 79 MMHG | RESPIRATION RATE: 18 BRPM

## 2023-11-01 LAB
ANION GAP SERPL CALC-SCNC: 7 MEQ/L
BUN SERPL-MCNC: 14.1 MG/DL (ref 8.4–25.7)
CALCIUM SERPL-MCNC: 8.6 MG/DL (ref 8.8–10)
CHLORIDE SERPL-SCNC: 107 MMOL/L (ref 98–107)
CO2 SERPL-SCNC: 25 MMOL/L (ref 23–31)
CREAT SERPL-MCNC: 0.87 MG/DL (ref 0.73–1.18)
CREAT/UREA NIT SERPL: 16
ERYTHROCYTE [DISTWIDTH] IN BLOOD BY AUTOMATED COUNT: 14.1 % (ref 11.5–17)
GFR SERPLBLD CREATININE-BSD FMLA CKD-EPI: >60 MLS/MIN/1.73/M2
GLUCOSE SERPL-MCNC: 135 MG/DL (ref 82–115)
HCT VFR BLD AUTO: 34.1 % (ref 42–52)
HGB BLD-MCNC: 11.4 G/DL (ref 14–18)
MCH RBC QN AUTO: 36.9 PG (ref 27–31)
MCHC RBC AUTO-ENTMCNC: 33.4 G/DL (ref 33–36)
MCV RBC AUTO: 110.4 FL (ref 80–94)
NRBC BLD AUTO-RTO: 0 %
PLATELET # BLD AUTO: 75 X10(3)/MCL (ref 130–400)
PMV BLD AUTO: 10.2 FL (ref 7.4–10.4)
POTASSIUM SERPL-SCNC: 4.3 MMOL/L (ref 3.5–5.1)
RBC # BLD AUTO: 3.09 X10(6)/MCL (ref 4.7–6.1)
SODIUM SERPL-SCNC: 139 MMOL/L (ref 136–145)
WBC # SPEC AUTO: 10.13 X10(3)/MCL (ref 4.5–11.5)

## 2023-11-01 PROCEDURE — 80048 BASIC METABOLIC PNL TOTAL CA: CPT | Performed by: SPECIALIST

## 2023-11-01 PROCEDURE — 97110 THERAPEUTIC EXERCISES: CPT

## 2023-11-01 PROCEDURE — 85027 COMPLETE CBC AUTOMATED: CPT | Performed by: SPECIALIST

## 2023-11-01 PROCEDURE — 94761 N-INVAS EAR/PLS OXIMETRY MLT: CPT

## 2023-11-01 PROCEDURE — 25000003 PHARM REV CODE 250: Performed by: SPECIALIST

## 2023-11-01 PROCEDURE — 97116 GAIT TRAINING THERAPY: CPT

## 2023-11-01 PROCEDURE — 94799 UNLISTED PULMONARY SVC/PX: CPT

## 2023-11-01 PROCEDURE — 63600175 PHARM REV CODE 636 W HCPCS: Performed by: SPECIALIST

## 2023-11-01 RX ORDER — CEFADROXIL 500 MG/1
1 CAPSULE ORAL EVERY 12 HOURS
Qty: 28 CAPSULE | Refills: 0 | Status: SHIPPED | OUTPATIENT
Start: 2023-11-01 | End: 2023-11-08

## 2023-11-01 RX ORDER — METHOCARBAMOL 750 MG/1
750 TABLET, FILM COATED ORAL EVERY 8 HOURS PRN
Qty: 21 TABLET | Refills: 0 | Status: SHIPPED | OUTPATIENT
Start: 2023-11-01 | End: 2023-11-08

## 2023-11-01 RX ORDER — POLYETHYLENE GLYCOL 3350 17 G/17G
17 POWDER, FOR SOLUTION ORAL 2 TIMES DAILY
Qty: 28 EACH | Refills: 0 | Status: SHIPPED | OUTPATIENT
Start: 2023-11-01 | End: 2023-11-15

## 2023-11-01 RX ORDER — HYDROCODONE BITARTRATE AND ACETAMINOPHEN 5; 325 MG/1; MG/1
1 TABLET ORAL EVERY 4 HOURS PRN
Qty: 42 TABLET | Refills: 0 | Status: SHIPPED | OUTPATIENT
Start: 2023-11-01 | End: 2023-11-08

## 2023-11-01 RX ADMIN — ASPIRIN 81 MG CHEWABLE TABLET 81 MG: 81 TABLET CHEWABLE at 08:11

## 2023-11-01 RX ADMIN — FAMOTIDINE 20 MG: 20 TABLET ORAL at 08:11

## 2023-11-01 RX ADMIN — APIXABAN 2.5 MG: 2.5 TABLET, FILM COATED ORAL at 08:11

## 2023-11-01 RX ADMIN — METOCLOPRAMIDE HYDROCHLORIDE 10 MG: 5 INJECTION INTRAMUSCULAR; INTRAVENOUS at 01:11

## 2023-11-01 RX ADMIN — METOCLOPRAMIDE HYDROCHLORIDE 10 MG: 5 INJECTION INTRAMUSCULAR; INTRAVENOUS at 08:11

## 2023-11-01 RX ADMIN — SENNOSIDES AND DOCUSATE SODIUM 2 TABLET: 8.6; 5 TABLET ORAL at 08:11

## 2023-11-01 RX ADMIN — METHOCARBAMOL 750 MG: 750 TABLET ORAL at 07:11

## 2023-11-01 RX ADMIN — CEFAZOLIN 2 G: 2 INJECTION, POWDER, FOR SOLUTION INTRAMUSCULAR; INTRAVENOUS at 03:11

## 2023-11-01 RX ADMIN — DOCUSATE SODIUM 200 MG: 100 CAPSULE, LIQUID FILLED ORAL at 06:11

## 2023-11-01 NOTE — PT/OT/SLP PROGRESS
"Physical Therapy Treatment    Patient Name:  Ean Funes   MRN:  [id number]    Recommendations:     Discharge Recommendations: Low Intensity Therapy  Discharge Equipment Recommendations: walker, rolling  Barriers to discharge: None    Assessment:     Ean Funes is a 63 y.o. male admitted with a medical diagnosis of Primary osteoarthritis of right knee.  He presents with the following impairments/functional limitations: weakness, impaired endurance, impaired functional mobility, decreased lower extremity function, pain, decreased ROM, edema, orthopedic precautions .    Rehab Prognosis: Good; patient would benefit from acute skilled PT services to address these deficits and reach maximum level of function.    Recent Surgery: Procedure(s) (LRB):  ROBOTIC ARTHROPLASTY, KNEE, TOTAL (Right) 1 Day Post-Op    Plan:     During this hospitalization, patient to be seen BID to address the identified rehab impairments via gait training, therapeutic activities, therapeutic exercises and progress toward the following goals:    Plan of Care Expires:  11/06/23    Subjective     Chief Complaint: R knee pain  Patient/Family Comments/goals:   Pain/Comfort:  Location - Side 1: Right  Location 1: knee  Pain Addressed 1: Pre-medicate for activity, Reposition, Distraction, Cessation of Activity      Objective:     Communicated with nurse prior to session.  Patient found up in chair with peripheral IV upon PT entry to room.     General Precautions: Standard, fall  Orthopedic Precautions: RLE weight bearing as tolerated  Braces:    Respiratory Status: Room air     Functional Mobility:  Bed Mobility:     Supine to Sit: stand by assistance  Transfers:     Sit to Stand:  stand by assistance with rolling walker  Car Transfer: stand by assistance with  rolling walker  using  Step Transfer  Gait: Pt ambulated 300 ft. W rw and SBA, using step through gait pattern at normal pace.  Stairs:  Pt ascended/descended 3 stair(s) and 4" curb " step with Rolling Walker with left handrail with Contact Guard Assistance.           Treatment & Education:  Pt edu on total knee protocol and importance of frequent mobility  Pt completed TKA therex x10 AROM    Patient left up in chair with all lines intact, call button in reach, and nurse notified..    GOALS:   Multidisciplinary Problems       Physical Therapy Goals          Problem: Physical Therapy    Goal Priority Disciplines Outcome Goal Variances Interventions   Physical Therapy Goal     PT, PT/OT Ongoing, Progressing     Description: Pt will improve functional independence by performing:    Bed mobility: SBA  Sit to stand: SBA with rolling walker MET  Bed to chair: SBA with Stand Step  with rolling walker   Car Transfer: SBA with rolling walker MET  Ambulation x 200'  feet with SBA and rolling walker MET  1 Step (Curb): Min A  and rolling walker MET  3 Steps: Min A  and L HR MET  right knee AROM flexion (in degrees): 90  right knee AROM extension (in degrees): 0   Independent with total knee HEP MET                           Time Tracking:     PT Received On:    PT Start Time: 0950     PT Stop Time: 1015  PT Total Time (min): 25 min     Billable Minutes: Gait Training 17 and Therapeutic Exercise 8    Treatment Type: Treatment  PT/PTA: PT     Number of PTA visits since last PT visit: 0     11/01/2023

## 2023-11-01 NOTE — DISCHARGE SUMMARY
[facility]  Discharge Note  Short Stay    Admit Date: 10/31/2023    Discharge Date and Time: 11/1/2023  2:08 PM     Attending Physician: No att. providers found     Discharge Provider: Kaden Swain    Diagnoses:  Active Hospital Problems    Diagnosis  POA    *Primary osteoarthritis of right knee [M17.11]  Yes    Peripheral arterial disease [I73.9]  Yes     Chronic      Resolved Hospital Problems   No resolved problems to display.       Discharged Condition: good    Hospital Course:    Patient presented for elective robotic assisted right total knee arthroplasty The patient had no complications during the hospital stay and was discharged home in stable condition full weightbearing with assistance of a walker. The patient was given a consult for physical therapy and rehab as well as a follow-up appointment in our office in 2 weeks for reevaluation. The patient was instructed on wound care and dressing change as well as to continue the CLAUDIA hose while at home. Prescriptions for continuation of the VTE prophylaxis and pain control were given. The patients home medications were resumed please see discharge med rec for that.     Final Diagnoses: Same as principal problem.    Disposition: Home or Self Care    Follow up/Patient Instructions:    Medications:  Reconciled Home Medications:      Medication List        START taking these medications      cefadroxil 500 MG Cap  Commonly known as: DURICEF  Take 2 capsules (1 g total) by mouth every 12 (twelve) hours. for 7 days     HYDROcodone-acetaminophen 5-325 mg per tablet  Commonly known as: NORCO  Take 1 tablet by mouth every 4 (four) hours as needed (Moderate pain 2-5/10 pain scale).     methocarbamoL 750 MG Tab  Commonly known as: ROBAXIN  Take 1 tablet (750 mg total) by mouth every 8 (eight) hours as needed.     polyethylene glycol 17 gram Pwpk  Commonly known as: GLYCOLAX  Take 17 g by mouth 2 (two) times daily. for 14 days            CHANGE how you take  these medications      levETIRAcetam 500 MG Tab  Commonly known as: KEPPRA  Take 1 tablet (500 mg total) by mouth 2 (two) times daily.  What changed: how much to take            CONTINUE taking these medications      ascorbic acid (vitamin C) 1000 MG tablet  Commonly known as: VITAMIN C  Take 1 tablet by mouth every morning.     * azelastine 137 mcg (0.1 %) nasal spray  Commonly known as: ASTELIN  1 spray by Nasal route as needed.     * azelastine 137 mcg (0.1 %) nasal spray  Commonly known as: ASTELIN  Astelin 137 mcg (0.1 %) nasal spray aerosol   Spray 2 sprays twice a day by intranasal route as directed for 30 days.     cetirizine 10 MG tablet  Commonly known as: ZYRTEC  Take 10 mg by mouth once daily.     clopidogreL 75 mg tablet  Commonly known as: PLAVIX  Take 75 mg by mouth once daily.     cyanocobalamin 1000 MCG tablet  Commonly known as: VITAMIN B-12  Take 1 tablet by mouth every morning.     doxycycline 50 mg/5 mL Syrp  Commonly known as: VIBRAMYCIN  Take 50 mg by mouth.     ELIQUIS 5 mg Tab  Generic drug: apixaban  Take 5 mg by mouth 2 (two) times daily.     famotidine 40 MG tablet  Commonly known as: PEPCID  Take 40 mg by mouth nightly.     FeroSuL 325 mg (65 mg iron) Tab tablet  Generic drug: ferrous sulfate  Take by mouth every evening.     fluticasone propionate 50 mcg/actuation nasal spray  Commonly known as: FLONASE  1 spray by Nasal route as needed.     ipratropium 42 mcg (0.06 %) nasal spray  Commonly known as: ATROVENT  2 sprays as needed.     metoprolol tartrate 50 MG tablet  Commonly known as: LOPRESSOR  Take 50 mg by mouth 2 (two) times daily.     pantoprazole 40 MG tablet  Commonly known as: PROTONIX  Take 40 mg by mouth every morning.     rosuvastatin 20 MG tablet  Commonly known as: CRESTOR  Take 20 mg by mouth every evening.     tadalafiL 5 MG tablet  Commonly known as: CIALIS  Take 5 mg by mouth as needed for Erectile Dysfunction.     tamsulosin 0.4 mg Cap  Commonly known as: FLOMAX  Take  1 capsule by mouth every evening.     traZODone 50 MG tablet  Commonly known as: DESYREL  Take 50 mg by mouth every evening.           * This list has 2 medication(s) that are the same as other medications prescribed for you. Read the directions carefully, and ask your doctor or other care provider to review them with you.                Discharge Procedure Orders   Diet general   Order Comments: As prior to surgery     Keep surgical extremity elevated     Ice to affected area   Order Comments: using barrier between ice and skin (specify duration&frequency)     Call MD for:  temperature >100.4     Call MD for:  persistent nausea and vomiting     Call MD for:  severe uncontrolled pain     Call MD for:  difficulty breathing, headache or visual disturbances     Call MD for:  redness, tenderness, or signs of infection (pain, swelling, redness, odor or green/yellow discharge around incision site)     Call MD for:  hives     Wound care routine (specify)   Order Comments: Wound care routine:  Keep surgical dressing on until seen in office.  Okay to shower with dressing     Activity as tolerated     Weight bearing as tolerated   Order Comments: Weight bearing as indicated on your written discharge instructions      Follow-up Information       Israel Barron MD Follow up on 11/13/2023.    Specialty: Orthopedic Surgery  Why: Orthopedic follow up appointment on Monday, 11/13 at 2pm with Saúl (physician assistant)  Contact information:  [address]  [address]  [facility] [id number] [phone]               [place] - Follow up.    Why: This is the outpatient therapy facility.They will contact pt with appt date & time. Call if you have questions or concerns.  Contact information:  [address]  [place] [id number] [phone]                             Discharge Procedure Orders (must include Diet, Follow-up, Activity):   Discharge Procedure Orders (must include Diet, Follow-up,  Activity)   Diet general   Order Comments: As prior to surgery     Keep surgical extremity elevated     Ice to affected area   Order Comments: using barrier between ice and skin (specify duration&frequency)     Call MD for:  temperature >100.4     Call MD for:  persistent nausea and vomiting     Call MD for:  severe uncontrolled pain     Call MD for:  difficulty breathing, headache or visual disturbances     Call MD for:  redness, tenderness, or signs of infection (pain, swelling, redness, odor or green/yellow discharge around incision site)     Call MD for:  hives     Wound care routine (specify)   Order Comments: Wound care routine:  Keep surgical dressing on until seen in office.  Okay to shower with dressing     Activity as tolerated     Weight bearing as tolerated   Order Comments: Weight bearing as indicated on your written discharge instructions

## 2023-11-01 NOTE — DISCHARGE INSTRUCTIONS
[facility]  [address]  [place] [address]  Phone [phone]       /      Fax [phone]  SURGEON: Dr. Barron    After discharge, all questions or concerns should be handled at your surgeon's office ([phone]). If it is a weekend or after hours, you will get the surgeon on call.   Discharge Medications:  PAIN MANAGEMENT: Next Dose Available   Robaxin/Methocarbamol 750mg (Muscle Relaxer) - Every 6-8 hours AS NEEDED for muscle spasms, thigh pain or additional pain control  Anytime   Norco 5/325mg (Hydrocodone/Acetaminophen) (Pain Med) - every 4-6 hours AS NEEDED for pain Anytime   COMPLICATION PREVENTION MEDS: Next Dose DUE   MiraLAX 17gm - once or twice a day while on narcotics and muscle relaxers for constipation prevention PM on 11/1/2023        Total Knee Replacement                                                                                                                                    PAIN MEDICATIONS/PAIN MANAGEMENT: (Use the medication log in your discharge packet to keep track of your medications)      Tylenol/Acetaminophen 500mg every 4 hours, around the clock (WHILE AWAKE).       Norco 5mg/325mg (Hydrocodone/Acetaminophen) (pain pill) - You can take 1 tablet every 4-6 hours for pain. If the pain is mild, take 1/2 of a pill. Once you start taking a half of a pain pill, you can take a Tylenol 325mg with each dose for a little extra pain relief without side effects. Gradually decrease the use as the pain lessens. As you decrease the Norco, increase the Tylenol.    **NO MORE THAN 3000mg OF TYLENOL IN 24 HOURS**.     Robaxin/Methocarbamol 750mg (muscle relaxer)- you can take every 6-8 hours as needed for muscle spasms, thigh pain and stiffness, additional pain control or breakthrough pain medications. This medication is helpful for pain control while lessening your need for narcotics. Please reduce the use gradually as the pain and spasms lessen. DO  NOT TAKE AT THE SAME TIME AS A PAIN PILL. YOU WILL BE BETTER SERVED WITH 2 HOURS BETWEEN PAIN PILL AND MUSCLE RELAXER.       **Other things that help with pain control is WALKING, COMPRESSION WRAP, ICE and ELEVATION!!**    BLOOD CLOT PREVENTION:       Resume Eliquis tonight and restart Plavix tomorrow, 11/2/2023.  You need to continuing wearing your compression stocking (JESU Hose - ThromboEmbolic Disease Prevention Device) for the next 2-6 weeks post-op. It is ok to remove them for hygiene and at bedtime.   Hand wash and Dry. **If the swelling persists in the legs after you stop wearing the Jesu hose, continue to wear them until the swelling decreases.**  REMOVE STOCKINGS AT LEAST DAILY FOR SKIN ASSESSMENT.   Do NOT let the stockings roll down, creating a tourniquet around the back of your knee. If you need to, leave the excess at the bottom of the stocking.   The best thing you can do to prevent blood clots is to walk around as much as possible, AT LEAST EVERY 1-2 HOURS.       CONSTIPATION PREVENTION:   Miralax or Senokot S/Viki-Colace and Stool softeners EVERY DAY while on pain meds.  Use other more aggressive over the counter LAXATIVES as needed for constipation (Examples: Milk of Magnesia, Dulcolax tabs or suppository, Magnesium Citrate, Fleet's Enema...etc.)   Drink lots of water.  Increase Fiber in diet.  Increase walking distance each day  DO NOT GO MORE THAN 2 DAYS WITHOUT HAVING A BOWEL MOVEMENT!    ACTIVITY:   Weight bearing precautions as follows:  FULL weight bearing to operative leg with walker.   DO NOT TAKE YOURSELF OFF OF THE WALKER TOO SOON. ALLOW YOUR OUTPATIENT THERAPIST or SURGEON TO GUIDE YOU.   Range of motion as tolerated. Work on BENDING and STRAIGHTENING your knee. Change positions often throughout the day. DO NOT PUT ANYTHING BEHIND THE KNEE KEEPING IT IN A BENT POSITION.   Elevate affected extremity way ABOVE THE LEVEL OF THE HEART to reduce swelling.  Walk around at least every 1-2 hours  while awake.   No heavy lifting, pulling, pushing or straining.  Ice the Knee, thigh and lower leg AS MUCH AS POSSIBLE  Outpatient Physical Therapy - Bring prescription to clinic of choice as soon as possible.      WOUND CARE:   DO NOT REMOVE DRESSING.  Keep the dressing on the incisional sight intact until your follow up appointment at the Surgeon's office.  If the dressing appears saturated with drainage contact the surgeon's office.  NOTIFY MD OF EXCESSIVE WOUND DRAINAGE.     DO NOT WET WOUND or apply any ointments, creams, lotions or antiseptics.  Ace wrap - apply your compression stocking and apply the ace wrap where the stocking stops for extra added compression to the knee.   May wet incision AFTER 2 weeks- (after you follow-up with your surgeon).   Ok to shower before then if able to keep wound from getting wet (plastic barrier, saran wrap or cling wrap and tape).   DO NOT TOUCH INCISION      URINARY RETENTION:  If you start having difficulty urinating, decrease the use of Pain pills and muscle relaxers and notify your primary care doctor.     PNEUMONIA PREVENTION:  Stay out of bed as much as possible and walk around every 1-2 hours.  Continue breathing exercises (Incentive Spirometry) every 1-2 hours while mobility is limited and while you are on pain pills.    FALL PREVENTION:  Wear sturdy shoes that fit well - Wearing shoes with high heels or slippery soles, or shoes that are too loose, can lead to falls. Walking around in bare feet, or only socks, can also increase your risk of falling.  Use walker as long as your surgeon and therapist recommend it  Use good lighting and  throw rugs, electrical cords, furniture and clutter (anything than can cause you to trip at home.   Non-slip rug in bathroom or shower      INFECTION PREVENTION:    Cefadroxil 500 mg - 2 tablets twice daily for 7 days.    Proper handwashing before and after dressing changes. Do not wet the wound. Wound care instructions as  written above. NOTIFY MD OF EXCESSIVE WOUND DRAINAGE.  No alcohol, smoking or tobacco products  Pets should not be allowed around the wound or the dressing.   Treat UTI and skin infections as soon as possible.  Pre-medicate with antibiotics prior to dental or surgical procedures.   If you are diabetic, MAINTAIN GOOD BLOOD SUGAR CONTROL (Below 150) DURING YOUR RECOVERY. If you see high numbers, notify your primary care doctor.     Call your SURGEON'S OFFICE ([phone]) if you experience the following signs and symptoms of infection:   Unusual redness, swelling, excessive, cloudy or foul smelling drainage at the incision site.   Persistent low grade temp OR a temp greater than 102 F, unrelieved by Tylenol  Pain at surgical site, unrelieved by pain meds    Warning signs of a blood clot in your leg: (CALL YOUR SURGEON)  New onset or increasing pain in calf, new onset tenderness or redness above or below the knee or increasing swelling of your calf, ankle, or foot.  Warning signs that a blood clot has traveled to your lungs: (REPORT TO THE ER/CALL 933)  Sudden or increase in Shortness of breath, sudden onset of chest pains, or  Localized chest pain with coughing.       IF ANY ISSUES ARISE AND YOU FEEL THE NEED TO CALL YOUR PRIMARY CARE DOCTOR, PLEASE LET YOUR SURGEON KNOW AS WELL.     For emergencies, please report to OUR ([facility]) Emergency department and tell them to call YOUR SURGEON at [phone].     BEFORE MAKING ANY CHANGES TO THE MEDICAL CARE PLAN OR GOING TO THE EMERGENCY ROOM, PLEASE CONTACT THE SURGEON.    3rd floor nursing unit # [phone]  Use this number for questions about your discharge instructions or problems filling your discharge prescriptions.

## 2023-11-01 NOTE — PLAN OF CARE
11/01/23 1113   Discharge Assessment   Assessment Type Discharge Planning Assessment   Source of Information patient   Communicated CHIOMA with patient/caregiver Yes   Reason For Admission s/p TKR   People in Home significant other   Do you expect to return to your current living situation? Yes   Do you have help at home or someone to help you manage your care at home? Yes   Who are your caregiver(s) and their phone number(s)? S/O - MARIS [phone]   Prior to hospitilization cognitive status: Alert/Oriented   Current cognitive status: Alert/Oriented   Walking or Climbing Stairs ambulation difficulty, requires equipment   Dressing/Bathing bathing difficulty, requires equipment   Equipment Currently Used at Home walker, rolling;crutches   Readmission within 30 days? No   Patient currently being followed by outpatient case management? No   Do you currently have service(s) that help you manage your care at home? No   Do you take prescription medications? Yes   Do you have prescription coverage? Yes   Do you have any problems affording any of your prescribed medications? No   Is the patient taking medications as prescribed? yes   Who is going to help you get home at discharge? S/O   How do you get to doctors appointments? car, drives self   Are you on dialysis? No   Do you take coumadin? No   DME Needed Upon Discharge  walker, rolling   Discharge Plan discussed with: Patient   Transition of Care Barriers None   Discharge Plan A Home with family   Discharge Plan B Home with family     S/p TKR. Jessie w pt ... Girlfriend, Maris to  w homecare. Pt has RW. Provider list given. Foc obtained.   Called referral for outpatient therapy to [facility] They will contact pt with appt date & time.   PCP: Dr. Frank Pino   Contact # Marisr [phone]    Patient DID participate in a pre-op exercise regimen

## 2023-11-01 NOTE — NURSING
Discharge paperwork completed.  Reviewed with patient.  Verbalized understanding.  IV removed, pressure held, and dressing applied.  Patient escorted to private vehicle per wheelchair and CNA.

## 2023-11-01 NOTE — NURSING
Nurse Note:       11/1/2023   3:14 PM      Perception of Care - Joint Replacement Population Total Joint Surgery List: KNEE    Patient able to verbalize one way to treat/prevent SWELLING at home (other than pain medication)    [x] Yes   [] No   [] Further Education Provided        Attending Nurse:  Mojgan

## 2023-11-02 ENCOUNTER — NURSE TRIAGE (OUTPATIENT)
Dept: ADMINISTRATIVE | Facility: CLINIC | Age: 63
End: 2023-11-02
Payer: MEDICARE

## 2023-11-02 ENCOUNTER — PATIENT OUTREACH (OUTPATIENT)
Dept: ADMINISTRATIVE | Facility: CLINIC | Age: 63
End: 2023-11-02
Payer: MEDICARE

## 2023-11-02 ENCOUNTER — TELEPHONE (OUTPATIENT)
Dept: ORTHOPEDICS | Facility: CLINIC | Age: 63
End: 2023-11-02
Payer: MEDICARE

## 2023-11-02 RX ORDER — ASCORBIC ACID 250 MG
250 TABLET ORAL DAILY
COMMUNITY

## 2023-11-02 NOTE — PROGRESS NOTES
C3 nurse spoke with Ean Funes and Maris on speaker for a TCC post hospital discharge follow up call. The patient has a scheduled HOSFU appointment with KIN Quezada @ Dr. Barron's office on 11/13/2023 @ 2 pm.

## 2023-11-03 ENCOUNTER — OFFICE VISIT (OUTPATIENT)
Dept: ORTHOPEDICS | Facility: CLINIC | Age: 63
End: 2023-11-03
Payer: MEDICARE

## 2023-11-03 VITALS
BODY MASS INDEX: 27.47 KG/M2 | DIASTOLIC BLOOD PRESSURE: 47 MMHG | HEIGHT: 66 IN | SYSTOLIC BLOOD PRESSURE: 84 MMHG | HEART RATE: 60 BPM

## 2023-11-03 DIAGNOSIS — I73.9 PERIPHERAL ARTERIAL DISEASE: ICD-10-CM

## 2023-11-03 DIAGNOSIS — Z96.651 S/P TOTAL KNEE REPLACEMENT, RIGHT: Primary | ICD-10-CM

## 2023-11-03 LAB — VIEW PATHOLOGY REPORT (RELIAPATH): NORMAL

## 2023-11-03 PROCEDURE — 3078F PR MOST RECENT DIASTOLIC BLOOD PRESSURE < 80 MM HG: ICD-10-PCS | Mod: CPTII,,, | Performed by: SPECIALIST

## 2023-11-03 PROCEDURE — 1159F PR MEDICATION LIST DOCUMENTED IN MEDICAL RECORD: ICD-10-PCS | Mod: CPTII,,, | Performed by: SPECIALIST

## 2023-11-03 PROCEDURE — 3074F SYST BP LT 130 MM HG: CPT | Mod: CPTII,,, | Performed by: SPECIALIST

## 2023-11-03 PROCEDURE — 3044F HG A1C LEVEL LT 7.0%: CPT | Mod: CPTII,,, | Performed by: SPECIALIST

## 2023-11-03 PROCEDURE — 3044F PR MOST RECENT HEMOGLOBIN A1C LEVEL <7.0%: ICD-10-PCS | Mod: CPTII,,, | Performed by: SPECIALIST

## 2023-11-03 PROCEDURE — 1160F RVW MEDS BY RX/DR IN RCRD: CPT | Mod: CPTII,,, | Performed by: SPECIALIST

## 2023-11-03 PROCEDURE — 1159F MED LIST DOCD IN RCRD: CPT | Mod: CPTII,,, | Performed by: SPECIALIST

## 2023-11-03 PROCEDURE — 99024 POSTOP FOLLOW-UP VISIT: CPT | Mod: ,,, | Performed by: SPECIALIST

## 2023-11-03 PROCEDURE — 99024 PR POST-OP FOLLOW-UP VISIT: ICD-10-PCS | Mod: ,,, | Performed by: SPECIALIST

## 2023-11-03 PROCEDURE — 1160F PR REVIEW ALL MEDS BY PRESCRIBER/CLIN PHARMACIST DOCUMENTED: ICD-10-PCS | Mod: CPTII,,, | Performed by: SPECIALIST

## 2023-11-03 PROCEDURE — 3078F DIAST BP <80 MM HG: CPT | Mod: CPTII,,, | Performed by: SPECIALIST

## 2023-11-03 PROCEDURE — 3074F PR MOST RECENT SYSTOLIC BLOOD PRESSURE < 130 MM HG: ICD-10-PCS | Mod: CPTII,,, | Performed by: SPECIALIST

## 2023-11-03 RX ORDER — HYDROCODONE BITARTRATE AND ACETAMINOPHEN 10; 325 MG/1; MG/1
1 TABLET ORAL EVERY 4 HOURS PRN
Qty: 42 TABLET | Refills: 0 | Status: SHIPPED | OUTPATIENT
Start: 2023-11-03 | End: 2023-11-10

## 2023-11-03 NOTE — TELEPHONE ENCOUNTER
SO, Maris, states pt took all night time medications and vomited approximately 10 mins later. Did not see any pills/capsules in emesis. Pt is now in bed sleeping.  Questioning if pt should take another dose of all PM meds.   Advised not to take anymore PM medication. Advised to monitor for elevated BP. Instructed to call back if any symptoms develop or if triage is desired. Further instructed to take all usual medication regimen in AM as normal. SO VU.         Reason for Disposition   Caller has medicine question, adult has minor symptoms, caller declines triage, AND triager answers question    Protocols used: Medication Question Call-A-

## 2023-11-03 NOTE — PROGRESS NOTES
Past Medical History:   Diagnosis Date    Coronary artery disease     Digestive disorder     High cholesterol     Hypertension     Insomnia     Peripheral arterial disease 10/31/2023    Seasonal allergies     Seizures     Sleep apnea     noncompliant with cpap    Stroke 2012    raiza stroke, lost peripherial vision on Left    Voiding dysfunction        Past Surgical History:   Procedure Laterality Date    BYPASS GRAFT Left 03/2021    iliac to groin to ankle  *no tourniquet*    BYPASS GRAFT Right 08/2022    groin to ankle *no tourniquet*    CAROTID STENT Right 2017    100%  blocked    carotid stent Left 2021    80% blocked    CATARACT EXTRACTION W/  INTRAOCULAR LENS IMPLANT Bilateral     COLONOSCOPY      CORONARY ARTERY BYPASS GRAFT  2017    x 3 bypass, became infected, flap on left side    MOUTH SURGERY  12/2022    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    ascorbic acid, vitamin C, (VITAMIN C) 250 MG tablet Take 250 mg by mouth once daily.    azelastine (ASTELIN) 137 mcg (0.1 %) nasal spray 1 spray by Nasal route as needed.    azelastine (ASTELIN) 137 mcg (0.1 %) nasal spray Astelin 137 mcg (0.1 %) nasal spray aerosol   Spray 2 sprays twice a day by intranasal route as directed for 30 days.    cefadroxil (DURICEF) 500 MG Cap Take 2 capsules (1 g total) by mouth every 12 (twelve) hours. for 7 days    cetirizine (ZYRTEC) 10 MG tablet Take 10 mg by mouth once daily.    clopidogreL (PLAVIX) 75 mg tablet Take 75 mg by mouth once daily.    cyanocobalamin (VITAMIN B-12) 1000 MCG tablet Take 1 tablet by mouth every  morning.    doxycycline (VIBRAMYCIN) 50 mg/5 mL Syrp Take 50 mg by mouth.    ELIQUIS 5 mg Tab Take 5 mg by mouth 2 (two) times daily.    famotidine (PEPCID) 40 MG tablet Take 40 mg by mouth nightly.    FEROSUL 325 mg (65 mg iron) Tab tablet Take by mouth every evening.    fluticasone propionate (FLONASE) 50 mcg/actuation nasal spray 1 spray by Nasal route as needed.    HYDROcodone-acetaminophen (NORCO)  mg per tablet Take 1 tablet by mouth every 4 (four) hours as needed for Pain.    HYDROcodone-acetaminophen (NORCO) 5-325 mg per tablet Take 1 tablet by mouth every 4 (four) hours as needed (Moderate pain 2-5/10 pain scale).    ipratropium (ATROVENT) 42 mcg (0.06 %) nasal spray 2 sprays as needed.    methocarbamoL (ROBAXIN) 750 MG Tab Take 1 tablet (750 mg total) by mouth every 8 (eight) hours as needed.    metoprolol tartrate (LOPRESSOR) 50 MG tablet Take 50 mg by mouth 2 (two) times daily.    pantoprazole (PROTONIX) 40 MG tablet Take 40 mg by mouth every morning.    polyethylene glycol (GLYCOLAX) 17 gram PwPk Take 17 g by mouth 2 (two) times daily. for 14 days    rosuvastatin (CRESTOR) 20 MG tablet Take 20 mg by mouth every evening.    tadalafiL (CIALIS) 5 MG tablet Take 5 mg by mouth as needed for Erectile Dysfunction.    tamsulosin (FLOMAX) 0.4 mg Cap Take 1 capsule by mouth every evening.    traZODone (DESYREL) 50 MG tablet Take 50 mg by mouth every evening.    levETIRAcetam (KEPPRA) 500 MG Tab Take 1 tablet (500 mg total) by mouth 2 (two) times daily. (Patient taking differently: Take 250 mg by mouth 2 (two) times daily.)     No current facility-administered medications for this visit.     Facility-Administered Medications Ordered in Other Visits   Medication    BUPivacaine (PF) 0.25% (2.5 mg/ml) injection 75 mg    dexAMETHasone injection 4 mg    electrolyte-A infusion    HYDROcodone-acetaminophen 5-325 mg per tablet 1 tablet    HYDROmorphone (PF) injection 0.4 mg    lactated ringers infusion    midazolam  (VERSED) 1 mg/mL injection 0.5-4 mg    ondansetron injection 4 mg    prochlorperazine injection Soln 5 mg       Review of patient's allergies indicates:   Allergen Reactions    Strawberry Anaphylaxis, Itching and Other (See Comments)     Other reaction(s): Hives, Throat closes       Family History   Family history unknown: Yes       Social History     Socioeconomic History    Marital status:    Tobacco Use    Smoking status: Every Day     Current packs/day: 1.00     Average packs/day: 1 pack/day for 45.0 years (45.0 ttl pk-yrs)     Types: Cigarettes     Start date: 10/16/1978    Smokeless tobacco: Never   Substance and Sexual Activity    Alcohol use: Yes     Alcohol/week: 5.0 standard drinks of alcohol     Types: 5 Cans of beer per week     Comment: Daily    Drug use: Never    Sexual activity: Yes     Partners: Female       Chief Complaint:   Chief Complaint   Patient presents with    Follow-up     Wound check Right TKA sx 10/31/23, patient threw up last night wasn't eating after his sx that much so he threw up the medication       Consulting Physician: No ref. provider found    History of present illness:    This is a 63 y.o. year old male who complains of bloody oozing from his anterior midline incision on the inferior aspect of the incision.  Patient is a peripheral arterial disease patient with vascular grafts and we have anticipated this as a possibility because he is on Eliquis.  He is here for an evaluation today and dressing change.    Review of Systems:    Constitution:   Denies chills, fever, and sweats.  HENT:   Denies headaches or blurry vision.  Cardiovascular:  Denies chest pain or irregular heart beat.  Respiratory:   Denies cough or shortness of breath.  Gastrointestinal:  Denies abdominal pain, nausea, or vomiting.  Musculoskeletal:   Denies muscle cramps.  Neurological:   Denies dizziness or focal weakness.  Psychiatric/Behavior: Normal mental status.  Hematology/Lymph:  Denies bleeding  "problem or easy bruising/bleeding.  Skin:    Denies rash or suspicious lesions.    Examination:    Vital Signs:    Vitals:    11/03/23 1014   BP: (!) 84/47   Pulse: 60   Height: 5' 6" (1.676 m)       Body mass index is 27.47 kg/m².    Constitution:   Well-developed, well nourished patient in no acute distress.  Neurological:   Alert and oriented x 3 and cooperative to examination.     Psychiatric/Behavior: Normal mental status.  Respiratory:   No shortness of breath.non labored breathing.  Cardiovascular: Regular rate and rhythm  Eyes:    Extraoccular muscles intact  Skin:    No scars, rash or suspicious lesions.    Physical Exam:  Right knee exam:   No redness, no increased heat   Minimal postoperative tenderness   Range of motion 0° to 90°   Dark blood oozing from inferior aspect of the incision secondary to hemarthrosis  No defect palpable of the arthrotomy   1+ dorsal pedal pulse   Normal capillary refill   Intact sensory and motor function  No discoloration of the toes or foot  Skin around the wound was cleaned with peroxide to rid it of the dried blood and then a nonadhesive sterile dressing was applied with 4x4s, ABDs, cast padding and an Ace wrap.  Patient was then placed in a knee immobilizer and we will shut down physical therapy until we re-evaluate him next week.  I will see him for re-evaluation on Monday and then make a decision about therapy again at that time.          Assessment: S/P total knee replacement, right    Peripheral arterial disease        Plan:  Follow up Monday for a recheck of the wound.  Knee immobilizer.  No range motion to the right knee currently.  Weight-bearing as tolerated with walker.  No physical therapy until he has been re-evaluated and he will continue with Duricef 500 mg 1 p.o. twice daily for extended antibiotic prophylaxis.      DISCLAIMER: This note may have been dictated using voice recognition software and may contain grammatical errors.     NOTE: Consult report sent " to referring provider via EP[employer] EMR.3

## 2023-11-03 NOTE — TELEPHONE ENCOUNTER
I was about to call the patient to relay Kaden's message when the patient called stating that he has blood seeping through his bandage.     I spoke with Kaden and relayed this message. He stated to have the patient come into the office this morning.     I relayed this message to the patient and the stated they will be here before 10 am.

## 2023-11-06 ENCOUNTER — OFFICE VISIT (OUTPATIENT)
Dept: ORTHOPEDICS | Facility: CLINIC | Age: 63
End: 2023-11-06
Payer: MEDICARE

## 2023-11-06 DIAGNOSIS — Z96.651 S/P TOTAL KNEE REPLACEMENT, RIGHT: Primary | ICD-10-CM

## 2023-11-06 DIAGNOSIS — I73.9 PERIPHERAL ARTERIAL DISEASE: ICD-10-CM

## 2023-11-06 PROCEDURE — 1159F PR MEDICATION LIST DOCUMENTED IN MEDICAL RECORD: ICD-10-PCS | Mod: CPTII,,, | Performed by: SPECIALIST

## 2023-11-06 PROCEDURE — 3044F HG A1C LEVEL LT 7.0%: CPT | Mod: CPTII,,, | Performed by: SPECIALIST

## 2023-11-06 PROCEDURE — 99024 PR POST-OP FOLLOW-UP VISIT: ICD-10-PCS | Mod: ,,, | Performed by: SPECIALIST

## 2023-11-06 PROCEDURE — 3044F PR MOST RECENT HEMOGLOBIN A1C LEVEL <7.0%: ICD-10-PCS | Mod: CPTII,,, | Performed by: SPECIALIST

## 2023-11-06 PROCEDURE — 1159F MED LIST DOCD IN RCRD: CPT | Mod: CPTII,,, | Performed by: SPECIALIST

## 2023-11-06 PROCEDURE — 1160F PR REVIEW ALL MEDS BY PRESCRIBER/CLIN PHARMACIST DOCUMENTED: ICD-10-PCS | Mod: CPTII,,, | Performed by: SPECIALIST

## 2023-11-06 PROCEDURE — 1160F RVW MEDS BY RX/DR IN RCRD: CPT | Mod: CPTII,,, | Performed by: SPECIALIST

## 2023-11-06 PROCEDURE — 99024 POSTOP FOLLOW-UP VISIT: CPT | Mod: ,,, | Performed by: SPECIALIST

## 2023-11-06 NOTE — PROGRESS NOTES
Past Medical History:   Diagnosis Date    Coronary artery disease     Digestive disorder     High cholesterol     Hypertension     Insomnia     Peripheral arterial disease 10/31/2023    Seasonal allergies     Seizures     Sleep apnea     noncompliant with cpap    Stroke 2012    raiza stroke, lost peripherial vision on Left    Voiding dysfunction        Past Surgical History:   Procedure Laterality Date    BYPASS GRAFT Left 03/2021    iliac to groin to ankle  *no tourniquet*    BYPASS GRAFT Right 08/2022    groin to ankle *no tourniquet*    CAROTID STENT Right 2017    100%  blocked    carotid stent Left 2021    80% blocked    CATARACT EXTRACTION W/  INTRAOCULAR LENS IMPLANT Bilateral     COLONOSCOPY      CORONARY ARTERY BYPASS GRAFT  2017    x 3 bypass, became infected, flap on left side    MOUTH SURGERY  12/2022    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    ascorbic acid, vitamin C, (VITAMIN C) 250 MG tablet Take 250 mg by mouth once daily.    azelastine (ASTELIN) 137 mcg (0.1 %) nasal spray 1 spray by Nasal route as needed.    azelastine (ASTELIN) 137 mcg (0.1 %) nasal spray Astelin 137 mcg (0.1 %) nasal spray aerosol   Spray 2 sprays twice a day by intranasal route as directed for 30 days.    cefadroxil (DURICEF) 500 MG Cap Take 2 capsules (1 g total) by mouth every 12 (twelve) hours. for 7 days    cetirizine (ZYRTEC) 10 MG tablet Take 10 mg by mouth once daily.    clopidogreL (PLAVIX) 75 mg tablet Take 75 mg by mouth once daily.    cyanocobalamin (VITAMIN B-12) 1000 MCG tablet Take 1 tablet by mouth every  morning.    doxycycline (VIBRAMYCIN) 50 mg/5 mL Syrp Take 50 mg by mouth.    ELIQUIS 5 mg Tab Take 5 mg by mouth 2 (two) times daily.    famotidine (PEPCID) 40 MG tablet Take 40 mg by mouth nightly.    FEROSUL 325 mg (65 mg iron) Tab tablet Take by mouth every evening.    fluticasone propionate (FLONASE) 50 mcg/actuation nasal spray 1 spray by Nasal route as needed.    HYDROcodone-acetaminophen (NORCO)  mg per tablet Take 1 tablet by mouth every 4 (four) hours as needed for Pain.    HYDROcodone-acetaminophen (NORCO) 5-325 mg per tablet Take 1 tablet by mouth every 4 (four) hours as needed (Moderate pain 2-5/10 pain scale).    ipratropium (ATROVENT) 42 mcg (0.06 %) nasal spray 2 sprays as needed.    methocarbamoL (ROBAXIN) 750 MG Tab Take 1 tablet (750 mg total) by mouth every 8 (eight) hours as needed.    metoprolol tartrate (LOPRESSOR) 50 MG tablet Take 50 mg by mouth 2 (two) times daily.    pantoprazole (PROTONIX) 40 MG tablet Take 40 mg by mouth every morning.    polyethylene glycol (GLYCOLAX) 17 gram PwPk Take 17 g by mouth 2 (two) times daily. for 14 days    rosuvastatin (CRESTOR) 20 MG tablet Take 20 mg by mouth every evening.    tadalafiL (CIALIS) 5 MG tablet Take 5 mg by mouth as needed for Erectile Dysfunction.    tamsulosin (FLOMAX) 0.4 mg Cap Take 1 capsule by mouth every evening.    traZODone (DESYREL) 50 MG tablet Take 50 mg by mouth every evening.    levETIRAcetam (KEPPRA) 500 MG Tab Take 1 tablet (500 mg total) by mouth 2 (two) times daily. (Patient taking differently: Take 250 mg by mouth 2 (two) times daily.)     No current facility-administered medications for this visit.     Facility-Administered Medications Ordered in Other Visits   Medication    BUPivacaine (PF) 0.25% (2.5 mg/ml) injection 75 mg    dexAMETHasone injection 4 mg    electrolyte-A infusion    HYDROcodone-acetaminophen 5-325 mg per tablet 1 tablet    HYDROmorphone (PF) injection 0.4 mg    lactated ringers infusion    midazolam  (VERSED) 1 mg/mL injection 0.5-4 mg    ondansetron injection 4 mg    prochlorperazine injection Soln 5 mg       Review of patient's allergies indicates:   Allergen Reactions    Strawberry Anaphylaxis, Itching and Other (See Comments)     Other reaction(s): Hives, Throat closes       Family History   Family history unknown: Yes       Social History     Socioeconomic History    Marital status:    Tobacco Use    Smoking status: Every Day     Current packs/day: 1.00     Average packs/day: 1 pack/day for 45.1 years (45.1 ttl pk-yrs)     Types: Cigarettes     Start date: 10/16/1978    Smokeless tobacco: Never   Substance and Sexual Activity    Alcohol use: Yes     Alcohol/week: 5.0 standard drinks of alcohol     Types: 5 Cans of beer per week     Comment: Daily    Drug use: Never    Sexual activity: Yes     Partners: Female       Chief Complaint:   Chief Complaint   Patient presents with    Follow-up     Wound Check, Right TKA sx 10/31/23, states pain wise sicne they went up on the medication he is doing much better       Consulting Physician: No ref. provider found    History of present illness:    This is a 63 y.o. year old male who feels much better since being in the brace.  He only has small focal area of bloody drainage from his anticoagulant.  We will continue with knee immobilizer and a new dressing and he will follow up for another wound check in 1 week.    Review of Systems:    Constitution:   Denies chills, fever, and sweats.  HENT:   Denies headaches or blurry vision.  Cardiovascular:  Denies chest pain or irregular heart beat.  Respiratory:   Denies cough or shortness of breath.  Gastrointestinal:  Denies abdominal pain, nausea, or vomiting.  Musculoskeletal:   Denies muscle cramps.  Neurological:   Denies dizziness or focal weakness.  Psychiatric/Behavior: Normal mental status.  Hematology/Lymph:  Denies bleeding problem or easy bruising/bleeding.  Skin:    Denies rash or suspicious  lesions.    Examination:    Vital Signs:  There were no vitals filed for this visit.    There is no height or weight on file to calculate BMI.    Constitution:   Well-developed, well nourished patient in no acute distress.  Neurological:   Alert and oriented x 3 and cooperative to examination.     Psychiatric/Behavior: Normal mental status.  Respiratory:   No shortness of breath.non labored breathing.  Cardiovascular: Regular rate and rhythm  Eyes:    Extraoccular muscles intact  Skin:    No scars, rash or suspicious lesions.    Physical Exam:  Right knee exam:   1+ dorsal pedal pulse   Normal capillary refill in all 5 toes   Central portion of the wound has 1 small focal area bloody drainage but this has greatly decreased from last week.  No tenderness to palpation and no surrounding erythema or increased heat          Assessment: S/P total knee replacement, right    Peripheral arterial disease        Plan:  Continue with knee immobilizer and current dressing that has been placed today and follow up in 1 week for wound check      DISCLAIMER: This note may have been dictated using voice recognition software and may contain grammatical errors.     NOTE: Consult report sent to referring provider via [employer] EMR.

## 2023-11-13 ENCOUNTER — HOSPITAL ENCOUNTER (OUTPATIENT)
Dept: RADIOLOGY | Facility: CLINIC | Age: 63
Discharge: HOME | End: 2023-11-13
Attending: SPECIALIST
Payer: MEDICARE

## 2023-11-13 ENCOUNTER — OFFICE VISIT (OUTPATIENT)
Dept: ORTHOPEDICS | Facility: CLINIC | Age: 63
End: 2023-11-13
Payer: MEDICARE

## 2023-11-13 VITALS
BODY MASS INDEX: 27.32 KG/M2 | DIASTOLIC BLOOD PRESSURE: 56 MMHG | HEIGHT: 66 IN | SYSTOLIC BLOOD PRESSURE: 109 MMHG | HEART RATE: 57 BPM | WEIGHT: 170 LBS

## 2023-11-13 DIAGNOSIS — Z96.651 S/P TOTAL KNEE REPLACEMENT, RIGHT: Primary | ICD-10-CM

## 2023-11-13 DIAGNOSIS — Z96.651 S/P TOTAL KNEE REPLACEMENT, RIGHT: ICD-10-CM

## 2023-11-13 PROCEDURE — 73562 X-RAY EXAM OF KNEE 3: CPT | Mod: RT,,, | Performed by: SPECIALIST

## 2023-11-13 PROCEDURE — 1159F PR MEDICATION LIST DOCUMENTED IN MEDICAL RECORD: ICD-10-PCS | Mod: CPTII,,, | Performed by: PHYSICIAN ASSISTANT

## 2023-11-13 PROCEDURE — 99024 PR POST-OP FOLLOW-UP VISIT: ICD-10-PCS | Mod: ,,, | Performed by: PHYSICIAN ASSISTANT

## 2023-11-13 PROCEDURE — 3044F HG A1C LEVEL LT 7.0%: CPT | Mod: CPTII,,, | Performed by: PHYSICIAN ASSISTANT

## 2023-11-13 PROCEDURE — 1160F RVW MEDS BY RX/DR IN RCRD: CPT | Mod: CPTII,,, | Performed by: PHYSICIAN ASSISTANT

## 2023-11-13 PROCEDURE — 99024 POSTOP FOLLOW-UP VISIT: CPT | Mod: ,,, | Performed by: PHYSICIAN ASSISTANT

## 2023-11-13 PROCEDURE — 3078F DIAST BP <80 MM HG: CPT | Mod: CPTII,,, | Performed by: PHYSICIAN ASSISTANT

## 2023-11-13 PROCEDURE — 73562 XR KNEE 3 VIEW RIGHT: ICD-10-PCS | Mod: RT,,, | Performed by: SPECIALIST

## 2023-11-13 PROCEDURE — 1159F MED LIST DOCD IN RCRD: CPT | Mod: CPTII,,, | Performed by: PHYSICIAN ASSISTANT

## 2023-11-13 PROCEDURE — 1160F PR REVIEW ALL MEDS BY PRESCRIBER/CLIN PHARMACIST DOCUMENTED: ICD-10-PCS | Mod: CPTII,,, | Performed by: PHYSICIAN ASSISTANT

## 2023-11-13 PROCEDURE — 3078F PR MOST RECENT DIASTOLIC BLOOD PRESSURE < 80 MM HG: ICD-10-PCS | Mod: CPTII,,, | Performed by: PHYSICIAN ASSISTANT

## 2023-11-13 PROCEDURE — 3074F PR MOST RECENT SYSTOLIC BLOOD PRESSURE < 130 MM HG: ICD-10-PCS | Mod: CPTII,,, | Performed by: PHYSICIAN ASSISTANT

## 2023-11-13 PROCEDURE — 3074F SYST BP LT 130 MM HG: CPT | Mod: CPTII,,, | Performed by: PHYSICIAN ASSISTANT

## 2023-11-13 PROCEDURE — 3044F PR MOST RECENT HEMOGLOBIN A1C LEVEL <7.0%: ICD-10-PCS | Mod: CPTII,,, | Performed by: PHYSICIAN ASSISTANT

## 2023-11-13 RX ORDER — METHOCARBAMOL 750 MG/1
500 TABLET, FILM COATED ORAL 4 TIMES DAILY
COMMUNITY
End: 2023-12-21 | Stop reason: SDUPTHER

## 2023-11-13 RX ORDER — HYDROCODONE BITARTRATE AND ACETAMINOPHEN 10; 325 MG/1; MG/1
1 TABLET ORAL
COMMUNITY
End: 2023-12-07 | Stop reason: SDUPTHER

## 2023-11-13 NOTE — PROGRESS NOTES
Chief Complaint:   Chief Complaint   Patient presents with    Right Knee - Wound Check     Pt states he is doing good regarding his knee. Pt denies any drainage in his incision or pain. No complaints.        History of present illness:    63-year-old male presents office today for one-week follow-up on his right knee.  He is just about 2 weeks S/P right total knee arthroplasty.  He has been wearing his knee immobilizer to help with some intermittent drainage that he has been having.  He denies any drainage on his bandages.  He has been compliant with his brace.  Therapy has been on hold.    Past Medical History:   Diagnosis Date    Coronary artery disease     Digestive disorder     High cholesterol     Hypertension     Insomnia     Peripheral arterial disease 10/31/2023    Seasonal allergies     Seizures     Sleep apnea     noncompliant with cpap    Stroke 2012    raiza stroke, lost peripherial vision on Left    Voiding dysfunction        Past Surgical History:   Procedure Laterality Date    BYPASS GRAFT Left 03/2021    iliac to groin to ankle  *no tourniquet*    BYPASS GRAFT Right 08/2022    groin to ankle *no tourniquet*    CAROTID STENT Right 2017    100%  blocked    carotid stent Left 2021    80% blocked    CATARACT EXTRACTION W/  INTRAOCULAR LENS IMPLANT Bilateral     COLONOSCOPY      CORONARY ARTERY BYPASS GRAFT  2017    x 3 bypass, became infected, flap on left side    MOUTH SURGERY  12/2022    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;  Service: Orthopedics;  Laterality: Right;    SINUS SURGERY  01/2023    TONSILLECTOMY      adenoidectomy    WISDOM TOOTH EXTRACTION         Current Outpatient Medications   Medication Sig    ascorbic acid, vitamin C, (VITAMIN C) 1000 MG tablet Take 1 tablet by mouth every morning.    ascorbic acid, vitamin C, (VITAMIN C) 250 MG tablet Take 250 mg by  mouth once daily.    azelastine (ASTELIN) 137 mcg (0.1 %) nasal spray 1 spray by Nasal route as needed.    azelastine (ASTELIN) 137 mcg (0.1 %) nasal spray Astelin 137 mcg (0.1 %) nasal spray aerosol   Spray 2 sprays twice a day by intranasal route as directed for 30 days.    cetirizine (ZYRTEC) 10 MG tablet Take 10 mg by mouth once daily.    clopidogreL (PLAVIX) 75 mg tablet Take 75 mg by mouth once daily.    cyanocobalamin (VITAMIN B-12) 1000 MCG tablet Take 1 tablet by mouth every morning.    ELIQUIS 5 mg Tab Take 5 mg by mouth 2 (two) times daily.    famotidine (PEPCID) 40 MG tablet Take 40 mg by mouth nightly.    FEROSUL 325 mg (65 mg iron) Tab tablet Take by mouth every evening.    fluticasone propionate (FLONASE) 50 mcg/actuation nasal spray 1 spray by Nasal route as needed.    HYDROcodone-acetaminophen (NORCO)  mg per tablet Take 1 tablet by mouth.    ipratropium (ATROVENT) 42 mcg (0.06 %) nasal spray 2 sprays as needed.    methocarbamoL (ROBAXIN) 750 MG Tab Take 500 mg by mouth 4 (four) times daily.    metoprolol tartrate (LOPRESSOR) 50 MG tablet Take 50 mg by mouth 2 (two) times daily.    pantoprazole (PROTONIX) 40 MG tablet Take 40 mg by mouth every morning.    polyethylene glycol (GLYCOLAX) 17 gram PwPk Take 17 g by mouth 2 (two) times daily. for 14 days    rosuvastatin (CRESTOR) 20 MG tablet Take 20 mg by mouth every evening.    tadalafiL (CIALIS) 5 MG tablet Take 5 mg by mouth as needed for Erectile Dysfunction.    tamsulosin (FLOMAX) 0.4 mg Cap Take 1 capsule by mouth every evening.    traZODone (DESYREL) 50 MG tablet Take 50 mg by mouth every evening.    doxycycline (VIBRAMYCIN) 50 mg/5 mL Syrp Take 50 mg by mouth.    levETIRAcetam (KEPPRA) 500 MG Tab Take 1 tablet (500 mg total) by mouth 2 (two) times daily. (Patient taking differently: Take 250 mg by mouth 2 (two) times daily.)     No current facility-administered medications for this visit.     Facility-Administered Medications Ordered in  "Other Visits   Medication    BUPivacaine (PF) 0.25% (2.5 mg/ml) injection 75 mg    dexAMETHasone injection 4 mg    electrolyte-A infusion    HYDROcodone-acetaminophen 5-325 mg per tablet 1 tablet    HYDROmorphone (PF) injection 0.4 mg    lactated ringers infusion    midazolam (VERSED) 1 mg/mL injection 0.5-4 mg    ondansetron injection 4 mg    prochlorperazine injection Soln 5 mg       Review of patient's allergies indicates:   Allergen Reactions    Strawberry Anaphylaxis, Itching and Other (See Comments)     Other reaction(s): Hives, Throat closes       Family History   Family history unknown: Yes       Social History     Socioeconomic History    Marital status:    Tobacco Use    Smoking status: Every Day     Current packs/day: 1.00     Average packs/day: 1 pack/day for 45.1 years (45.1 ttl pk-yrs)     Types: Cigarettes     Start date: 10/16/1978    Smokeless tobacco: Never   Substance and Sexual Activity    Alcohol use: Yes     Alcohol/week: 5.0 standard drinks of alcohol     Types: 5 Cans of beer per week     Comment: Daily    Drug use: Never    Sexual activity: Yes     Partners: Female         Review of Systems:    Constitution:   Denies chills, fever, and sweats.  HENT:   Denies headaches or blurry vision.  Cardiovascular:  Denies chest pain or irregular heart beat.  Respiratory:   Denies cough or shortness of breath.  Gastrointestinal:  Denies abdominal pain, nausea, or vomiting.  Musculoskeletal:   Denies muscle cramps.  Neurological:   Denies dizziness or focal weakness.  Psychiatric/Behavior: Normal mental status.  Hematology/Lymph:  Denies bleeding problem or easy bruising/bleeding.  Skin:    Denies rash or suspicious lesions.    Examination:    Vital Signs:    Vitals:    11/13/23 0941   BP: (!) 109/56   Pulse: (!) 57   Weight: 77.1 kg (170 lb)   Height: 5' 6" (1.676 m)       Body mass index is 27.44 kg/m².    Constitution:   Well-developed, well nourished patient in no acute " distress.  Neurological:   Alert and oriented x 3 and cooperative to examination.     Psychiatric/Behavior: Normal mental status.  Respiratory:   No shortness of breath.  Nonlabored breathing  Cardiovascular:           Regular rate and rhythm  Eyes:    Extraoccular muscles intact  Skin:    No scars, rash or suspicious lesions.    Physical Exam:     right Knee     Mild effusion, no redness    Surgical incision C/D/I with staples   Active flexion 85 degrees   Active extension 0 degrees    Thigh and calf compartments soft and compressible    NVI distally Weakness of the knee was observed.    X-Ray Knee:   Three views of the right knee were performed   IMPRESSIONS RADIOLOGY TEST     X-ray of knee was performed intact  knee implant        Assessment:     S/P total knee replacement, right  -     X-Ray Knee 3 View Right; Future; Expected date: 11/13/2023        Plan:      Surgical wound looks very good today with no drainage.  We will leave his staples in 1 more week due to the drainage she has had in the prior weeks in the fact that he is on Plavix and Eliquis.  Discontinue the knee immobilizer and he will resume physical therapy.  Follow up next week for wound check and removal of staples        Follow up in about 1 week (around 11/20/2023).    DISCLAIMER: This note may have been dictated using voice recognition software and may contain grammatical errors.

## 2023-11-14 ENCOUNTER — PATIENT MESSAGE (OUTPATIENT)
Dept: ORTHOPEDICS | Facility: CLINIC | Age: 63
End: 2023-11-14
Payer: MEDICARE

## 2023-11-14 RX ORDER — HYDROCODONE BITARTRATE AND ACETAMINOPHEN 10; 325 MG/1; MG/1
1 TABLET ORAL EVERY 4 HOURS PRN
Qty: 42 TABLET | Refills: 0 | Status: SHIPPED | OUTPATIENT
Start: 2023-11-14 | End: 2023-11-21

## 2023-11-14 RX ORDER — METHOCARBAMOL 750 MG/1
750 TABLET, FILM COATED ORAL 3 TIMES DAILY
Qty: 21 TABLET | Refills: 0 | Status: SHIPPED | OUTPATIENT
Start: 2023-11-14 | End: 2023-11-21

## 2023-11-15 ENCOUNTER — PATIENT MESSAGE (OUTPATIENT)
Dept: ORTHOPEDICS | Facility: CLINIC | Age: 63
End: 2023-11-15
Payer: MEDICARE

## 2023-11-20 ENCOUNTER — OFFICE VISIT (OUTPATIENT)
Dept: ORTHOPEDICS | Facility: CLINIC | Age: 63
End: 2023-11-20
Payer: MEDICARE

## 2023-11-20 VITALS
SYSTOLIC BLOOD PRESSURE: 122 MMHG | DIASTOLIC BLOOD PRESSURE: 71 MMHG | HEART RATE: 59 BPM | WEIGHT: 170 LBS | HEIGHT: 66 IN | BODY MASS INDEX: 27.32 KG/M2

## 2023-11-20 DIAGNOSIS — Z96.651 S/P TOTAL KNEE REPLACEMENT, RIGHT: Primary | ICD-10-CM

## 2023-11-20 PROCEDURE — 3078F PR MOST RECENT DIASTOLIC BLOOD PRESSURE < 80 MM HG: ICD-10-PCS | Mod: CPTII,,, | Performed by: PHYSICIAN ASSISTANT

## 2023-11-20 PROCEDURE — 3044F PR MOST RECENT HEMOGLOBIN A1C LEVEL <7.0%: ICD-10-PCS | Mod: CPTII,,, | Performed by: PHYSICIAN ASSISTANT

## 2023-11-20 PROCEDURE — 1159F MED LIST DOCD IN RCRD: CPT | Mod: CPTII,,, | Performed by: PHYSICIAN ASSISTANT

## 2023-11-20 PROCEDURE — 99024 PR POST-OP FOLLOW-UP VISIT: ICD-10-PCS | Mod: ,,, | Performed by: PHYSICIAN ASSISTANT

## 2023-11-20 PROCEDURE — 1160F PR REVIEW ALL MEDS BY PRESCRIBER/CLIN PHARMACIST DOCUMENTED: ICD-10-PCS | Mod: CPTII,,, | Performed by: PHYSICIAN ASSISTANT

## 2023-11-20 PROCEDURE — 1160F RVW MEDS BY RX/DR IN RCRD: CPT | Mod: CPTII,,, | Performed by: PHYSICIAN ASSISTANT

## 2023-11-20 PROCEDURE — 3074F PR MOST RECENT SYSTOLIC BLOOD PRESSURE < 130 MM HG: ICD-10-PCS | Mod: CPTII,,, | Performed by: PHYSICIAN ASSISTANT

## 2023-11-20 PROCEDURE — 99024 POSTOP FOLLOW-UP VISIT: CPT | Mod: ,,, | Performed by: PHYSICIAN ASSISTANT

## 2023-11-20 PROCEDURE — 3078F DIAST BP <80 MM HG: CPT | Mod: CPTII,,, | Performed by: PHYSICIAN ASSISTANT

## 2023-11-20 PROCEDURE — 3074F SYST BP LT 130 MM HG: CPT | Mod: CPTII,,, | Performed by: PHYSICIAN ASSISTANT

## 2023-11-20 PROCEDURE — 3044F HG A1C LEVEL LT 7.0%: CPT | Mod: CPTII,,, | Performed by: PHYSICIAN ASSISTANT

## 2023-11-20 PROCEDURE — 1159F PR MEDICATION LIST DOCUMENTED IN MEDICAL RECORD: ICD-10-PCS | Mod: CPTII,,, | Performed by: PHYSICIAN ASSISTANT

## 2023-11-20 RX ORDER — HYDROCODONE BITARTRATE AND ACETAMINOPHEN 10; 325 MG/1; MG/1
1 TABLET ORAL EVERY 6 HOURS PRN
Qty: 28 TABLET | Refills: 0 | Status: SHIPPED | OUTPATIENT
Start: 2023-11-20 | End: 2023-11-27

## 2023-11-20 NOTE — PROGRESS NOTES
Chief Complaint:   Chief Complaint   Patient presents with    Right Knee - Wound Check     Pt is present for a wound check and remove the remaining staples. The incision looks better. No drainage. Pt denies pain.       History of present illness:    63-year-old male presents office today for one-week follow-up on his right knee.  He is just about 3 weeks S/P right total knee arthroplasty.  He reports no drainage, mild swelling.  He is here for his remaining staples to be removed.  He has been working with physical therapy.  He is ambulating with a walker.    Past Medical History:   Diagnosis Date    Coronary artery disease     Digestive disorder     High cholesterol     Hypertension     Insomnia     Peripheral arterial disease 10/31/2023    Seasonal allergies     Seizures     Sleep apnea     noncompliant with cpap    Stroke 2012    raiza stroke, lost peripherial vision on Left    Voiding dysfunction        Past Surgical History:   Procedure Laterality Date    BYPASS GRAFT Left 03/2021    iliac to groin to ankle  *no tourniquet*    BYPASS GRAFT Right 08/2022    groin to ankle *no tourniquet*    CAROTID STENT Right 2017    100%  blocked    carotid stent Left 2021    80% blocked    CATARACT EXTRACTION W/  INTRAOCULAR LENS IMPLANT Bilateral     COLONOSCOPY      CORONARY ARTERY BYPASS GRAFT  2017    x 3 bypass, became infected, flap on left side    MOUTH SURGERY  12/2022    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    ascorbic acid, vitamin C, (VITAMIN C) 250 MG tablet Take 250 mg by mouth once daily.    azelastine  (ASTELIN) 137 mcg (0.1 %) nasal spray 1 spray by Nasal route as needed.    azelastine (ASTELIN) 137 mcg (0.1 %) nasal spray Astelin 137 mcg (0.1 %) nasal spray aerosol   Spray 2 sprays twice a day by intranasal route as directed for 30 days.    cetirizine (ZYRTEC) 10 MG tablet Take 10 mg by mouth once daily.    clopidogreL (PLAVIX) 75 mg tablet Take 75 mg by mouth once daily.    cyanocobalamin (VITAMIN B-12) 1000 MCG tablet Take 1 tablet by mouth every morning.    doxycycline (VIBRAMYCIN) 50 mg/5 mL Syrp Take 50 mg by mouth.    ELIQUIS 5 mg Tab Take 5 mg by mouth 2 (two) times daily.    famotidine (PEPCID) 40 MG tablet Take 40 mg by mouth nightly.    FEROSUL 325 mg (65 mg iron) Tab tablet Take by mouth every evening.    fluticasone propionate (FLONASE) 50 mcg/actuation nasal spray 1 spray by Nasal route as needed.    HYDROcodone-acetaminophen (NORCO)  mg per tablet Take 1 tablet by mouth.    HYDROcodone-acetaminophen (NORCO)  mg per tablet Take 1 tablet by mouth every 4 (four) hours as needed for Pain.    ipratropium (ATROVENT) 42 mcg (0.06 %) nasal spray 2 sprays as needed.    methocarbamoL (ROBAXIN) 750 MG Tab Take 500 mg by mouth 4 (four) times daily.    methocarbamoL (ROBAXIN) 750 MG Tab Take 1 tablet (750 mg total) by mouth 3 (three) times daily. for 7 days    metoprolol tartrate (LOPRESSOR) 50 MG tablet Take 50 mg by mouth 2 (two) times daily.    pantoprazole (PROTONIX) 40 MG tablet Take 40 mg by mouth every morning.    rosuvastatin (CRESTOR) 20 MG tablet Take 20 mg by mouth every evening.    tadalafiL (CIALIS) 5 MG tablet Take 5 mg by mouth as needed for Erectile Dysfunction.    tamsulosin (FLOMAX) 0.4 mg Cap Take 1 capsule by mouth every evening.    traZODone (DESYREL) 50 MG tablet Take 50 mg by mouth every evening.    HYDROcodone-acetaminophen (NORCO)  mg per tablet Take 1 tablet by mouth every 6 (six) hours as needed for Pain.    levETIRAcetam (KEPPRA) 500 MG Tab Take 1 tablet (500 mg  total) by mouth 2 (two) times daily. (Patient taking differently: Take 250 mg by mouth 2 (two) times daily.)     No current facility-administered medications for this visit.     Facility-Administered Medications Ordered in Other Visits   Medication    BUPivacaine (PF) 0.25% (2.5 mg/ml) injection 75 mg    dexAMETHasone injection 4 mg    electrolyte-A infusion    HYDROcodone-acetaminophen 5-325 mg per tablet 1 tablet    HYDROmorphone (PF) injection 0.4 mg    lactated ringers infusion    midazolam (VERSED) 1 mg/mL injection 0.5-4 mg    ondansetron injection 4 mg    prochlorperazine injection Soln 5 mg       Review of patient's allergies indicates:   Allergen Reactions    Strawberry Anaphylaxis, Itching and Other (See Comments)     Other reaction(s): Hives, Throat closes       Family History   Family history unknown: Yes       Social History     Socioeconomic History    Marital status:    Tobacco Use    Smoking status: Every Day     Current packs/day: 1.00     Average packs/day: 1 pack/day for 45.1 years (45.1 ttl pk-yrs)     Types: Cigarettes     Start date: 10/16/1978    Smokeless tobacco: Never   Substance and Sexual Activity    Alcohol use: Yes     Alcohol/week: 5.0 standard drinks of alcohol     Types: 5 Cans of beer per week     Comment: Daily    Drug use: Never    Sexual activity: Yes     Partners: Female         Review of Systems:    Constitution:   Denies chills, fever, and sweats.  HENT:   Denies headaches or blurry vision.  Cardiovascular:  Denies chest pain or irregular heart beat.  Respiratory:   Denies cough or shortness of breath.  Gastrointestinal:  Denies abdominal pain, nausea, or vomiting.  Musculoskeletal:   Denies muscle cramps.  Neurological:   Denies dizziness or focal weakness.  Psychiatric/Behavior: Normal mental status.  Hematology/Lymph:  Denies bleeding problem or easy bruising/bleeding.  Skin:    Denies rash or suspicious lesions.    Examination:    Vital Signs:    Vitals:     "11/20/23 0856   BP: 122/71   Pulse: (!) 59   Weight: 77.1 kg (170 lb)   Height: 5' 6" (1.676 m)       Body mass index is 27.44 kg/m².    Constitution:   Well-developed, well nourished patient in no acute distress.  Neurological:   Alert and oriented x 3 and cooperative to examination.     Psychiatric/Behavior: Normal mental status.  Respiratory:   No shortness of breath.  Nonlabored breathing  Cardiovascular:           Regular rate and rhythm  Eyes:    Extraoccular muscles intact  Skin:    No scars, rash or suspicious lesions.    Physical Exam:     right Knee     Mild effusion, no redness    Surgical incision C/D/I with staples   Active flexion 90 degrees   Active extension 0 degrees    Thigh and calf compartments soft and compressible    NVI distally Weakness of the knee was observed.            Assessment:     S/P total knee replacement, right    Other orders  -     HYDROcodone-acetaminophen (NORCO)  mg per tablet; Take 1 tablet by mouth every 6 (six) hours as needed for Pain.  Dispense: 28 tablet; Refill: 0        Plan:      His remaining staples removed today and Steri-Strips were applied.  He is working with physical therapy and doing well.  I will refill his Norco 10 for pain and he will follow up in 6 weeks for repeat evaluation with wound check at that time        Follow up in about 6 weeks (around 1/1/2024).    DISCLAIMER: This note may have been dictated using voice recognition software and may contain grammatical errors.     "

## 2023-12-07 RX ORDER — HYDROCODONE BITARTRATE AND ACETAMINOPHEN 10; 325 MG/1; MG/1
1 TABLET ORAL EVERY 6 HOURS PRN
Qty: 28 TABLET | Refills: 0 | Status: SHIPPED | OUTPATIENT
Start: 2023-12-07 | End: 2023-12-21 | Stop reason: SDUPTHER

## 2023-12-07 NOTE — TELEPHONE ENCOUNTER
Patient called asking for a refill of Norco 10-325mg.     I called the patient to let him know that I received his message.     I let him know that I will send a refill request to the provider and call when it has been sent to the pharmacy.     He verbalized a clear understanding.

## 2023-12-21 DIAGNOSIS — Z96.651 S/P TOTAL KNEE REPLACEMENT, RIGHT: Primary | ICD-10-CM

## 2023-12-21 NOTE — TELEPHONE ENCOUNTER
Patient's wife called stating that she is concerned about the patient. She states that the patient is still hurting even though he is going to PT and doing at home exercises.    She stated that the patient would like a refill of his Norco 10.     I relayed to the wife that at this point it's not uncommon to still have some pain. I advised in between medication rotations, he can take tylenol to help. I also advised that he take Robaxin to help.     She stated that he will need a refill of both medicines ( norco 10 and robaxin) and that he will try this until his follow up with Dr. Barron.     I let her know that I will call her back once I get the script sent to the pharmacy from the provider.     She verbalized a clear understanding.

## 2023-12-22 RX ORDER — METHOCARBAMOL 750 MG/1
750 TABLET, FILM COATED ORAL 3 TIMES DAILY
Qty: 21 TABLET | Refills: 0 | Status: SHIPPED | OUTPATIENT
Start: 2023-12-22 | End: 2023-12-29

## 2023-12-22 RX ORDER — HYDROCODONE BITARTRATE AND ACETAMINOPHEN 10; 325 MG/1; MG/1
1 TABLET ORAL 3 TIMES DAILY
Qty: 21 TABLET | Refills: 0 | Status: SHIPPED | OUTPATIENT
Start: 2023-12-22 | End: 2023-12-29

## 2024-01-03 ENCOUNTER — OFFICE VISIT (OUTPATIENT)
Dept: ORTHOPEDICS | Facility: CLINIC | Age: 64
End: 2024-01-03
Payer: MEDICARE

## 2024-01-03 VITALS
DIASTOLIC BLOOD PRESSURE: 71 MMHG | HEART RATE: 60 BPM | BODY MASS INDEX: 27.32 KG/M2 | SYSTOLIC BLOOD PRESSURE: 123 MMHG | WEIGHT: 170 LBS | HEIGHT: 66 IN

## 2024-01-03 DIAGNOSIS — Z96.651 S/P TOTAL KNEE REPLACEMENT, RIGHT: Primary | ICD-10-CM

## 2024-01-03 PROCEDURE — 1159F MED LIST DOCD IN RCRD: CPT | Mod: CPTII,,, | Performed by: PHYSICIAN ASSISTANT

## 2024-01-03 PROCEDURE — 1160F RVW MEDS BY RX/DR IN RCRD: CPT | Mod: CPTII,,, | Performed by: PHYSICIAN ASSISTANT

## 2024-01-03 PROCEDURE — 3074F SYST BP LT 130 MM HG: CPT | Mod: CPTII,,, | Performed by: PHYSICIAN ASSISTANT

## 2024-01-03 PROCEDURE — 99024 POSTOP FOLLOW-UP VISIT: CPT | Mod: ,,, | Performed by: PHYSICIAN ASSISTANT

## 2024-01-03 PROCEDURE — 3078F DIAST BP <80 MM HG: CPT | Mod: CPTII,,, | Performed by: PHYSICIAN ASSISTANT

## 2024-01-03 RX ORDER — HYDROCODONE BITARTRATE AND ACETAMINOPHEN 10; 325 MG/1; MG/1
1 TABLET ORAL 3 TIMES DAILY
Qty: 21 TABLET | Refills: 0 | Status: SHIPPED | OUTPATIENT
Start: 2024-01-03 | End: 2024-01-10

## 2024-01-03 RX ORDER — AMOXICILLIN 500 MG/1
500 CAPSULE ORAL ONCE
Qty: 4 CAPSULE | Refills: 0 | Status: SHIPPED | OUTPATIENT
Start: 2024-01-03 | End: 2024-01-03

## 2024-01-03 NOTE — PROGRESS NOTES
Chief Complaint:   Chief Complaint   Patient presents with    Follow-up     Follow up for RT TKA MICHAEL QUANX 10/31/23-01/29/24. Complaints of pain in the morning but improves. States that he finished PT in 12/2023       History of present illness:    63 year old male presents today for a 6 week f/u for his right knee.  He is 2 months s/p right TKA and overall doing well.  He has some pain in the morning but it improves during the day.  He denies swelling.    Past Medical History:   Diagnosis Date    Coronary artery disease     Digestive disorder     High cholesterol     Hypertension     Insomnia     Peripheral arterial disease 10/31/2023    Seasonal allergies     Seizures     Sleep apnea     noncompliant with cpap    Stroke 2012    raiza stroke, lost peripherial vision on Left    Voiding dysfunction        Past Surgical History:   Procedure Laterality Date    BYPASS GRAFT Left 03/2021    iliac to groin to ankle  *no tourniquet*    BYPASS GRAFT Right 08/2022    groin to ankle *no tourniquet*    CAROTID STENT Right 2017    100%  blocked    carotid stent Left 2021    80% blocked    CATARACT EXTRACTION W/  INTRAOCULAR LENS IMPLANT Bilateral     COLONOSCOPY      CORONARY ARTERY BYPASS GRAFT  2017    x 3 bypass, became infected, flap on left side    MOUTH SURGERY  12/2022    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    ascorbic acid, vitamin C, (VITAMIN C) 250 MG tablet Take 250 mg by mouth once daily.    azelastine (ASTELIN) 137 mcg (0.1 %) nasal spray 1 spray by Nasal route as needed.    azelastine (ASTELIN) 137 mcg (0.1  %) nasal spray Astelin 137 mcg (0.1 %) nasal spray aerosol   Spray 2 sprays twice a day by intranasal route as directed for 30 days.    cetirizine (ZYRTEC) 10 MG tablet Take 10 mg by mouth once daily.    clopidogreL (PLAVIX) 75 mg tablet Take 75 mg by mouth once daily.    cyanocobalamin (VITAMIN B-12) 1000 MCG tablet Take 1 tablet by mouth every morning.    doxycycline (VIBRAMYCIN) 50 mg/5 mL Syrp Take 50 mg by mouth.    ELIQUIS 5 mg Tab Take 5 mg by mouth 2 (two) times daily.    famotidine (PEPCID) 40 MG tablet Take 40 mg by mouth nightly.    FEROSUL 325 mg (65 mg iron) Tab tablet Take by mouth every evening.    fluticasone propionate (FLONASE) 50 mcg/actuation nasal spray 1 spray by Nasal route as needed.    ipratropium (ATROVENT) 42 mcg (0.06 %) nasal spray 2 sprays as needed.    metoprolol tartrate (LOPRESSOR) 50 MG tablet Take 50 mg by mouth 2 (two) times daily.    pantoprazole (PROTONIX) 40 MG tablet Take 40 mg by mouth every morning.    rosuvastatin (CRESTOR) 20 MG tablet Take 20 mg by mouth every evening.    tadalafiL (CIALIS) 5 MG tablet Take 5 mg by mouth as needed for Erectile Dysfunction.    tamsulosin (FLOMAX) 0.4 mg Cap Take 1 capsule by mouth every evening.    traZODone (DESYREL) 50 MG tablet Take 50 mg by mouth every evening.    levETIRAcetam (KEPPRA) 500 MG Tab Take 1 tablet (500 mg total) by mouth 2 (two) times daily. (Patient taking differently: Take 250 mg by mouth 2 (two) times daily.)     No current facility-administered medications for this visit.     Facility-Administered Medications Ordered in Other Visits   Medication    BUPivacaine (PF) 0.25% (2.5 mg/ml) injection 75 mg    dexAMETHasone injection 4 mg    HYDROcodone-acetaminophen 5-325 mg per tablet 1 tablet    HYDROmorphone (PF) injection 0.4 mg    lactated ringers infusion    midazolam (VERSED) 1 mg/mL injection 0.5-4 mg    ondansetron injection 4 mg    prochlorperazine injection Soln 5 mg       Review of patient's allergies indicates:  "  Allergen Reactions    Strawberry Anaphylaxis, Itching and Other (See Comments)     Other reaction(s): Hives, Throat closes       Family History   Family history unknown: Yes       Social History     Socioeconomic History    Marital status:    Tobacco Use    Smoking status: Every Day     Current packs/day: 1.00     Average packs/day: 1 pack/day for 45.2 years (45.2 ttl pk-yrs)     Types: Cigarettes     Start date: 10/16/1978    Smokeless tobacco: Never   Substance and Sexual Activity    Alcohol use: Yes     Alcohol/week: 5.0 standard drinks of alcohol     Types: 5 Cans of beer per week     Comment: Daily    Drug use: Never    Sexual activity: Yes     Partners: Female         Review of Systems:    Constitution:   Denies chills, fever, and sweats.  HENT:   Denies headaches or blurry vision.  Cardiovascular:  Denies chest pain or irregular heart beat.  Respiratory:   Denies cough or shortness of breath.  Gastrointestinal:  Denies abdominal pain, nausea, or vomiting.  Musculoskeletal:   Denies muscle cramps.  Neurological:   Denies dizziness or focal weakness.  Psychiatric/Behavior: Normal mental status.  Hematology/Lymph:  Denies bleeding problem or easy bruising/bleeding.  Skin:    Denies rash or suspicious lesions.    Examination:    Vital Signs:    Vitals:    01/03/24 1404   BP: 123/71   Pulse: 60   Weight: 77.1 kg (170 lb)   Height: 5' 6" (1.676 m)       Body mass index is 27.44 kg/m².    Constitution:   Well-developed, well nourished patient in no acute distress.  Neurological:   Alert and oriented x 3 and cooperative to examination.     Psychiatric/Behavior: Normal mental status.  Respiratory:   No shortness of breath.  Nonlabored breathing  Cardiovascular:           Regular rate and rhythm  Eyes:    Extraoccular muscles intact  Skin:    No scars, rash or suspicious lesions.    Physical Exam:     right Knee     No swelling, warmth    Well healed scar    No instability of the knee in mid or deep flexion "     Active flexion 125 degrees     Active extension 0 degrees     No weakness observed.        Assessment:     S/P total knee replacement, right        Plan:      Continue with physical therapy for one more month and then transition to home exercise program.  F/u in 9 months for repeat evaluation with right knee xrays        No follow-ups on file.    DISCLAIMER: This note may have been dictated using voice recognition software and may contain grammatical errors.

## 2024-02-09 NOTE — PLAN OF CARE
Problem: Physical Therapy  Goal: Physical Therapy Goal  Description: Pt will improve functional independence by performing:    Bed mobility: SBA  Sit to stand: SBA with rolling walker MET  Bed to chair: SBA with Stand Step  with rolling walker   Car Transfer: SBA with rolling walker MET  Ambulation x 200'  feet with SBA and rolling walker MET  1 Step (Curb): Min A  and rolling walker MET  3 Steps: Min A  and L HR MET  right knee AROM flexion (in degrees): 90  right knee AROM extension (in degrees): 0   Independent with total knee HEP MET      Outcome: Ongoing, Progressing      Yes

## 2024-10-28 ENCOUNTER — OFFICE VISIT (OUTPATIENT)
Dept: ORTHOPEDICS | Facility: CLINIC | Age: 64
End: 2024-10-28
Payer: MEDICARE

## 2024-10-28 ENCOUNTER — HOSPITAL ENCOUNTER (OUTPATIENT)
Dept: RADIOLOGY | Facility: CLINIC | Age: 64
Discharge: HOME | End: 2024-10-28
Attending: SPECIALIST
Payer: MEDICARE

## 2024-10-28 VITALS
DIASTOLIC BLOOD PRESSURE: 71 MMHG | HEIGHT: 66 IN | SYSTOLIC BLOOD PRESSURE: 130 MMHG | HEART RATE: 51 BPM | BODY MASS INDEX: 26.03 KG/M2 | WEIGHT: 162 LBS

## 2024-10-28 DIAGNOSIS — I73.9 PERIPHERAL ARTERIAL DISEASE: ICD-10-CM

## 2024-10-28 DIAGNOSIS — Z96.651 S/P TOTAL KNEE REPLACEMENT, RIGHT: Primary | ICD-10-CM

## 2024-10-28 DIAGNOSIS — M17.12 PRIMARY OSTEOARTHRITIS OF LEFT KNEE: ICD-10-CM

## 2024-10-28 DIAGNOSIS — Z96.651 S/P TOTAL KNEE REPLACEMENT, RIGHT: ICD-10-CM

## 2024-10-28 PROCEDURE — 3078F DIAST BP <80 MM HG: CPT | Mod: CPTII,,, | Performed by: SPECIALIST

## 2024-10-28 PROCEDURE — 73564 X-RAY EXAM KNEE 4 OR MORE: CPT | Mod: RT,,, | Performed by: SPECIALIST

## 2024-10-28 PROCEDURE — 3008F BODY MASS INDEX DOCD: CPT | Mod: CPTII,,, | Performed by: SPECIALIST

## 2024-10-28 PROCEDURE — 99213 OFFICE O/P EST LOW 20 MIN: CPT | Mod: ,,, | Performed by: SPECIALIST

## 2024-10-28 PROCEDURE — 1159F MED LIST DOCD IN RCRD: CPT | Mod: CPTII,,, | Performed by: SPECIALIST

## 2024-10-28 PROCEDURE — 3075F SYST BP GE 130 - 139MM HG: CPT | Mod: CPTII,,, | Performed by: SPECIALIST

## (undated) DEVICE — GAUZE SPONGE 4X4 12PLY

## (undated) DEVICE — SUT STRATAFIX PDS+ 1 CTX 24IN

## (undated) DEVICE — TAPE SILK 3IN

## (undated) DEVICE — SOL NACL IRR 3000ML

## (undated) DEVICE — CUSHION  WC FOAM 20X20X.75IN

## (undated) DEVICE — DRESSING AQUACEL AG 3.5X10IN

## (undated) DEVICE — Device

## (undated) DEVICE — ELECTRODE PATIENT RETURN DISP

## (undated) DEVICE — CUFF ATS 2 PORT SNGL BLDR 34IN

## (undated) DEVICE — BANDAGE ACE DOUBLE STER 6IN

## (undated) DEVICE — BLADE SAG DUAL CUT 25X90MM

## (undated) DEVICE — KIT TRIATHLON CR TIB PREP SZ5

## (undated) DEVICE — KIT DRAPE RIO ONE PIECE W/POCK

## (undated) DEVICE — CORD SILICONE RETRACTOR

## (undated) DEVICE — HOOD FLYTE SURGICOOL

## (undated) DEVICE — SUT ETHIBOND XTRA 1 CTX

## (undated) DEVICE — KIT CHECKPOINT TIBIAL

## (undated) DEVICE — DRAPE STERI U-SHAPED 47X51IN

## (undated) DEVICE — DRESSING N ADH OIL EMUL 3X8 3S

## (undated) DEVICE — GLOVE SENSICARE PI MICRO 8

## (undated) DEVICE — GOWN POLY REINF X-LONG 2XL

## (undated) DEVICE — APPLICATOR CHLORAPREP ORN 26ML

## (undated) DEVICE — PADDING WYTEX UNDRCST 6INX4YD

## (undated) DEVICE — COVER TABLE HVY DTY 60X90IN

## (undated) DEVICE — PIN BONE 3.2X110MM
Type: IMPLANTABLE DEVICE | Site: KNEE | Status: NON-FUNCTIONAL
Removed: 2023-10-31

## (undated) DEVICE — WRAP DEMAYO LEG STERILE

## (undated) DEVICE — DRAPE FULL SHEET 70X100IN

## (undated) DEVICE — BLADE MAKO STANDARD

## (undated) DEVICE — DRAPE MEDIUM SHEET 40X70IN

## (undated) DEVICE — KIT SURGICAL TURNOVER

## (undated) DEVICE — PAD ABD 8X10 STERILE

## (undated) DEVICE — KIT TRIATHLON CR TIB PREP SZ6

## (undated) DEVICE — GLOVE SENSICARE PI GRN 8

## (undated) DEVICE — SUT VICRYL 2-0 36 CT-1

## (undated) DEVICE — KIT VIZADISC KNEE TRACKING